# Patient Record
Sex: MALE | Race: WHITE | NOT HISPANIC OR LATINO
[De-identification: names, ages, dates, MRNs, and addresses within clinical notes are randomized per-mention and may not be internally consistent; named-entity substitution may affect disease eponyms.]

---

## 2018-08-21 ENCOUNTER — RX RENEWAL (OUTPATIENT)
Age: 48
End: 2018-08-21

## 2018-08-21 DIAGNOSIS — J45.902 UNSPECIFIED ASTHMA WITH STATUS ASTHMATICUS: ICD-10-CM

## 2018-08-21 PROBLEM — Z00.00 ENCOUNTER FOR PREVENTIVE HEALTH EXAMINATION: Status: ACTIVE | Noted: 2018-08-21

## 2018-09-28 ENCOUNTER — RECORD ABSTRACTING (OUTPATIENT)
Age: 48
End: 2018-09-28

## 2018-09-28 DIAGNOSIS — Z87.898 PERSONAL HISTORY OF OTHER SPECIFIED CONDITIONS: ICD-10-CM

## 2018-09-28 DIAGNOSIS — Z92.29 PERSONAL HISTORY OF OTHER DRUG THERAPY: ICD-10-CM

## 2018-09-28 DIAGNOSIS — G47.33 OBSTRUCTIVE SLEEP APNEA (ADULT) (PEDIATRIC): ICD-10-CM

## 2018-09-29 ENCOUNTER — RECORD ABSTRACTING (OUTPATIENT)
Age: 48
End: 2018-09-29

## 2018-09-29 RX ORDER — ALBUTEROL SULFATE 90 UG/1
108 (90 BASE) POWDER, METERED RESPIRATORY (INHALATION)
Refills: 0 | Status: ACTIVE | COMMUNITY

## 2018-09-29 RX ORDER — ALBUTEROL SULFATE 0.63 MG/3ML
0.63 SOLUTION RESPIRATORY (INHALATION)
Refills: 0 | Status: ACTIVE | COMMUNITY

## 2018-12-19 ENCOUNTER — APPOINTMENT (OUTPATIENT)
Dept: PULMONOLOGY | Facility: CLINIC | Age: 48
End: 2018-12-19
Payer: COMMERCIAL

## 2018-12-19 VITALS — HEART RATE: 102 BPM | SYSTOLIC BLOOD PRESSURE: 138 MMHG | DIASTOLIC BLOOD PRESSURE: 86 MMHG | OXYGEN SATURATION: 94 %

## 2018-12-19 DIAGNOSIS — J18.9 PNEUMONIA, UNSPECIFIED ORGANISM: ICD-10-CM

## 2018-12-19 PROCEDURE — 99214 OFFICE O/P EST MOD 30 MIN: CPT | Mod: 25

## 2018-12-19 PROCEDURE — 94060 EVALUATION OF WHEEZING: CPT

## 2018-12-19 PROCEDURE — 71046 X-RAY EXAM CHEST 2 VIEWS: CPT

## 2019-01-23 ENCOUNTER — RX RENEWAL (OUTPATIENT)
Age: 49
End: 2019-01-23

## 2019-03-27 ENCOUNTER — APPOINTMENT (OUTPATIENT)
Dept: PULMONOLOGY | Facility: CLINIC | Age: 49
End: 2019-03-27
Payer: COMMERCIAL

## 2019-03-27 VITALS
RESPIRATION RATE: 12 BRPM | WEIGHT: 280 LBS | HEIGHT: 72 IN | OXYGEN SATURATION: 97 % | HEART RATE: 114 BPM | BODY MASS INDEX: 37.93 KG/M2 | TEMPERATURE: 97.4 F | DIASTOLIC BLOOD PRESSURE: 85 MMHG | SYSTOLIC BLOOD PRESSURE: 144 MMHG

## 2019-03-27 PROCEDURE — 94060 EVALUATION OF WHEEZING: CPT

## 2019-03-27 PROCEDURE — 99213 OFFICE O/P EST LOW 20 MIN: CPT | Mod: 25

## 2019-03-27 NOTE — PROCEDURE
[FreeTextEntry1] : Spirometry: Normal without a significant bronchodilator response.  improved from last visit.\par

## 2019-03-27 NOTE — HISTORY OF PRESENT ILLNESS
[FreeTextEntry1] : on breo and singulair\par had pneumonia in december, admitted to hospital in [place] x 5 days, fu cxr was normal\par doing well except nasal congestion now that seasons are changing, zyrtec/flonase not working.\par no cough/dyspnea/wheeze

## 2019-09-10 ENCOUNTER — APPOINTMENT (OUTPATIENT)
Dept: PULMONOLOGY | Facility: CLINIC | Age: 49
End: 2019-09-10
Payer: COMMERCIAL

## 2019-09-10 VITALS — HEART RATE: 98 BPM | SYSTOLIC BLOOD PRESSURE: 120 MMHG | OXYGEN SATURATION: 94 % | DIASTOLIC BLOOD PRESSURE: 81 MMHG

## 2019-09-10 DIAGNOSIS — J45.901 UNSPECIFIED ASTHMA WITH (ACUTE) EXACERBATION: ICD-10-CM

## 2019-09-10 PROCEDURE — 94060 EVALUATION OF WHEEZING: CPT

## 2019-09-10 PROCEDURE — 99213 OFFICE O/P EST LOW 20 MIN: CPT | Mod: 25

## 2019-09-10 RX ORDER — PREDNISONE 10 MG/1
10 TABLET ORAL
Qty: 30 | Refills: 0 | Status: ACTIVE | COMMUNITY
Start: 2019-09-10 | End: 1900-01-01

## 2019-09-10 NOTE — REVIEW OF SYSTEMS
[Nasal Congestion] : nasal congestion [Cough] : cough [Dyspnea] : dyspnea [Negative] : Cardiovascular [Postnasal Drip] : no postnasal drip

## 2019-09-10 NOTE — HISTORY OF PRESENT ILLNESS
[FreeTextEntry1] : on breo and singulair\par \par has been coughing/wheezing for "a while" maybe more than weeks\par was on abx (doesn't recall which one) a few weeks ago for congestion\par some dyspnea.\par no fever/chills.

## 2019-09-10 NOTE — PROCEDURE
[FreeTextEntry1] : Spirometry: mild obstructive dysfunction with a significant bronchodilator response.  FEV1 decreased by about 500cc from last visit in march.\par \par Exhaled nitric oxide: 49, elevated\par

## 2019-09-25 ENCOUNTER — APPOINTMENT (OUTPATIENT)
Dept: PULMONOLOGY | Facility: CLINIC | Age: 49
End: 2019-09-25

## 2019-10-16 ENCOUNTER — APPOINTMENT (OUTPATIENT)
Dept: PULMONOLOGY | Facility: CLINIC | Age: 49
End: 2019-10-16

## 2019-10-21 ENCOUNTER — RX RENEWAL (OUTPATIENT)
Age: 49
End: 2019-10-21

## 2019-10-29 ENCOUNTER — APPOINTMENT (OUTPATIENT)
Dept: PULMONOLOGY | Facility: CLINIC | Age: 49
End: 2019-10-29
Payer: COMMERCIAL

## 2019-10-29 VITALS
SYSTOLIC BLOOD PRESSURE: 122 MMHG | HEART RATE: 101 BPM | RESPIRATION RATE: 16 BRPM | TEMPERATURE: 98 F | DIASTOLIC BLOOD PRESSURE: 79 MMHG | OXYGEN SATURATION: 94 %

## 2019-10-29 PROCEDURE — 94060 EVALUATION OF WHEEZING: CPT

## 2019-10-29 PROCEDURE — 99213 OFFICE O/P EST LOW 20 MIN: CPT | Mod: 25

## 2019-10-29 RX ORDER — ALBUTEROL SULFATE 90 UG/1
108 (90 BASE) AEROSOL, METERED RESPIRATORY (INHALATION) EVERY 4 HOURS
Qty: 1 | Refills: 5 | Status: ACTIVE | COMMUNITY
Start: 2019-10-29 | End: 1900-01-01

## 2019-10-29 RX ORDER — METHYLPREDNISOLONE 4 MG/1
4 TABLET ORAL
Qty: 1 | Refills: 1 | Status: ACTIVE | COMMUNITY
Start: 2019-10-29 | End: 1900-01-01

## 2019-10-29 NOTE — PHYSICAL EXAM
[Well Groomed] : well groomed [General Appearance - In No Acute Distress] : no acute distress [Neck Appearance] : the appearance of the neck was normal [Heart Sounds] : normal S1 and S2 [] : no respiratory distress [Respiration, Rhythm And Depth] : normal respiratory rhythm and effort [Exaggerated Use Of Accessory Muscles For Inspiration] : no accessory muscle use [Auscultation Breath Sounds / Voice Sounds] : lungs were clear to auscultation bilaterally [FreeTextEntry1] : no wheeze [Nail Clubbing] : no clubbing of the fingernails [Cyanosis, Localized] : no localized cyanosis

## 2019-10-29 NOTE — PROCEDURE
[FreeTextEntry1] : deferred pft today\par \par Prior exams reviewed:\par Spirometry: mild obstructive dysfunction with a significant bronchodilator response. FEV1 decreased by about 500cc from last visit in march.\par \par Exhaled nitric oxide: 49, elevated

## 2019-10-29 NOTE — HISTORY OF PRESENT ILLNESS
[FreeTextEntry1] : on breo and singulair\par \par says his wife has been telling him hes wheezing at night and not breathing right.  Refuses to do sleep study.\par \par says people tell him he is wheezing but he doesn't notice\par does not recall if prednisone helped him after last visit.

## 2019-10-29 NOTE — ASSESSMENT
[FreeTextEntry1] : cont breo and singulair\par add rescue inhaler to use prn\par will give rx for medrol pack to have at home just in case\par \par discussed likelihood of MICHELE pt understands but adamantly refuses to do sleep study.

## 2019-10-30 ENCOUNTER — APPOINTMENT (OUTPATIENT)
Dept: PULMONOLOGY | Facility: CLINIC | Age: 49
End: 2019-10-30

## 2020-02-24 ENCOUNTER — RX RENEWAL (OUTPATIENT)
Age: 50
End: 2020-02-24

## 2020-03-26 ENCOUNTER — APPOINTMENT (OUTPATIENT)
Dept: SURGERY | Facility: CLINIC | Age: 50
End: 2020-03-26

## 2020-04-05 ENCOUNTER — RX RENEWAL (OUTPATIENT)
Age: 50
End: 2020-04-05

## 2020-05-23 ENCOUNTER — RX RENEWAL (OUTPATIENT)
Age: 50
End: 2020-05-23

## 2020-08-01 ENCOUNTER — RX RENEWAL (OUTPATIENT)
Age: 50
End: 2020-08-01

## 2020-08-01 RX ORDER — FLUTICASONE FUROATE AND VILANTEROL TRIFENATATE 200; 25 UG/1; UG/1
200-25 POWDER RESPIRATORY (INHALATION) DAILY
Qty: 60 | Refills: 0 | Status: ACTIVE | COMMUNITY
Start: 2018-08-21 | End: 1900-01-01

## 2020-08-18 ENCOUNTER — APPOINTMENT (OUTPATIENT)
Dept: PULMONOLOGY | Facility: CLINIC | Age: 50
End: 2020-08-18
Payer: COMMERCIAL

## 2020-08-18 VITALS
HEIGHT: 72 IN | BODY MASS INDEX: 39.28 KG/M2 | HEART RATE: 90 BPM | WEIGHT: 290 LBS | SYSTOLIC BLOOD PRESSURE: 136 MMHG | DIASTOLIC BLOOD PRESSURE: 92 MMHG | TEMPERATURE: 97.9 F | OXYGEN SATURATION: 94 %

## 2020-08-18 DIAGNOSIS — Z91.09 OTHER ALLERGY STATUS, OTHER THAN TO DRUGS AND BIOLOGICAL SUBSTANCES: ICD-10-CM

## 2020-08-18 PROCEDURE — 99214 OFFICE O/P EST MOD 30 MIN: CPT

## 2020-08-18 NOTE — HISTORY OF PRESENT ILLNESS
[TextBox_4] : on breo, singulair, zyrtec\par \par working in [employer] environment\par nasal congestion and dyspnea\par some cough\par no fever/chills\par \par wants something stronger than breo for asthma.

## 2020-08-18 NOTE — PROCEDURE
[FreeTextEntry1] : **unable to do PFT today due to not having recent covid swab**\par \par \par Prior exams reviewed:\par Spirometry: mild obstructive dysfunction with a significant bronchodilator response.  FEV1 decreased by about 500cc from last visit in march.\par \par Exhaled nitric oxide: 49, elevated\par

## 2020-08-18 NOTE — PHYSICAL EXAM
[No Acute Distress] : no acute distress [Normal Appearance] : normal appearance [No Resp Distress] : no resp distress [Normal S1, S2] : normal s1, s2 [No Clubbing] : no clubbing [Clear to Auscultation Bilaterally] : clear to auscultation bilaterally [No Cyanosis] : no cyanosis

## 2020-08-18 NOTE — REVIEW OF SYSTEMS
[Nasal Congestion] : nasal congestion [Cough] : cough [SOB on Exertion] : sob on exertion [Hay Fever] : hay fever [Negative] : Cardiovascular

## 2020-12-08 ENCOUNTER — RX RENEWAL (OUTPATIENT)
Age: 50
End: 2020-12-08

## 2020-12-14 ENCOUNTER — RX RENEWAL (OUTPATIENT)
Age: 50
End: 2020-12-14

## 2021-02-02 ENCOUNTER — RX RENEWAL (OUTPATIENT)
Age: 51
End: 2021-02-02

## 2021-03-06 ENCOUNTER — RX RENEWAL (OUTPATIENT)
Age: 51
End: 2021-03-06

## 2021-03-09 ENCOUNTER — RX RENEWAL (OUTPATIENT)
Age: 51
End: 2021-03-09

## 2021-03-09 RX ORDER — ALBUTEROL SULFATE 90 UG/1
108 (90 BASE) INHALANT RESPIRATORY (INHALATION)
Qty: 8.5 | Refills: 0 | Status: ACTIVE | COMMUNITY
Start: 2020-12-08 | End: 1900-01-01

## 2021-03-25 ENCOUNTER — APPOINTMENT (OUTPATIENT)
Dept: PULMONOLOGY | Facility: CLINIC | Age: 51
End: 2021-03-25
Payer: COMMERCIAL

## 2021-03-25 VITALS
SYSTOLIC BLOOD PRESSURE: 132 MMHG | TEMPERATURE: 98 F | DIASTOLIC BLOOD PRESSURE: 93 MMHG | HEART RATE: 82 BPM | OXYGEN SATURATION: 95 %

## 2021-03-25 DIAGNOSIS — J45.909 UNSPECIFIED ASTHMA, UNCOMPLICATED: ICD-10-CM

## 2021-03-25 DIAGNOSIS — Z01.812 ENCOUNTER FOR PREPROCEDURAL LABORATORY EXAMINATION: ICD-10-CM

## 2021-03-25 DIAGNOSIS — Z20.822 ENCOUNTER FOR PREPROCEDURAL LABORATORY EXAMINATION: ICD-10-CM

## 2021-03-25 PROCEDURE — 71046 X-RAY EXAM CHEST 2 VIEWS: CPT

## 2021-03-25 PROCEDURE — 99072 ADDL SUPL MATRL&STAF TM PHE: CPT

## 2021-03-25 PROCEDURE — 99214 OFFICE O/P EST MOD 30 MIN: CPT | Mod: 25

## 2021-03-25 RX ORDER — METHYLPREDNISOLONE 4 MG/1
4 TABLET ORAL
Qty: 1 | Refills: 1 | Status: ACTIVE | COMMUNITY
Start: 2021-03-25 | End: 1900-01-01

## 2021-03-25 NOTE — ASSESSMENT
[FreeTextEntry1] : cont trelegy and singulair\par \par medrol pack to have on hand if symptoms worsen\par \par fu for PFT\par \par ENT/allergy referral

## 2021-03-25 NOTE — HISTORY OF PRESENT ILLNESS
[TextBox_4] : had covid in october\par not hospitalized\par took zpack\par \par has been wheezing at night\par a lot of nasal congestion--nasal sprays and allergy meds not working\par on trelegy but still wheezes\par also taking singulair

## 2021-03-25 NOTE — PROCEDURE
[FreeTextEntry1] : CXR: clear lungs, no focal consolidation or pleural effusions, cardiac silhouette appears normal.  No bony abnormality.\par \par \par \par Prior exams reviewed:\par Spirometry: mild obstructive dysfunction with a significant bronchodilator response.  FEV1 decreased by about 500cc from last visit in march.\par \par Exhaled nitric oxide: 49, elevated\par

## 2021-06-11 ENCOUNTER — RX RENEWAL (OUTPATIENT)
Age: 51
End: 2021-06-11

## 2021-06-11 RX ORDER — MONTELUKAST 10 MG/1
10 TABLET, FILM COATED ORAL
Qty: 30 | Refills: 2 | Status: ACTIVE | COMMUNITY
Start: 2019-10-21 | End: 1900-01-01

## 2021-10-13 ENCOUNTER — RX RENEWAL (OUTPATIENT)
Age: 51
End: 2021-10-13

## 2021-10-13 RX ORDER — FLUTICASONE FUROATE, UMECLIDINIUM BROMIDE AND VILANTEROL TRIFENATATE 100; 62.5; 25 UG/1; UG/1; UG/1
100-62.5-25 POWDER RESPIRATORY (INHALATION) DAILY
Qty: 60 | Refills: 2 | Status: ACTIVE | COMMUNITY
Start: 2020-08-18 | End: 1900-01-01

## 2022-05-23 ENCOUNTER — RX RENEWAL (OUTPATIENT)
Age: 52
End: 2022-05-23

## 2022-06-13 ENCOUNTER — RX RENEWAL (OUTPATIENT)
Age: 52
End: 2022-06-13

## 2024-05-25 ENCOUNTER — INPATIENT (INPATIENT)
Facility: HOSPITAL | Age: 54
LOS: 26 days | Discharge: HOME | DRG: 951 | End: 2024-06-21
Attending: PHYSICAL MEDICINE & REHABILITATION | Admitting: PHYSICAL MEDICINE & REHABILITATION
Payer: COMMERCIAL

## 2024-05-25 VITALS
RESPIRATION RATE: 16 BRPM | TEMPERATURE: 98 F | OXYGEN SATURATION: 97 % | HEIGHT: 72 IN | SYSTOLIC BLOOD PRESSURE: 116 MMHG | HEART RATE: 79 BPM | DIASTOLIC BLOOD PRESSURE: 80 MMHG | WEIGHT: 226.19 LBS

## 2024-05-25 DIAGNOSIS — Z98.890 OTHER SPECIFIED POSTPROCEDURAL STATES: Chronic | ICD-10-CM

## 2024-05-25 DIAGNOSIS — Z98.1 ARTHRODESIS STATUS: ICD-10-CM

## 2024-05-25 LAB — SARS-COV-2 RNA SPEC QL NAA+PROBE: SIGNIFICANT CHANGE UP

## 2024-05-25 PROCEDURE — 71045 X-RAY EXAM CHEST 1 VIEW: CPT | Mod: 26

## 2024-05-25 RX ORDER — ESCITALOPRAM OXALATE 20 MG/1
10 TABLET, FILM COATED ORAL DAILY
Refills: 0 | Status: DISCONTINUED | OUTPATIENT
Start: 2024-05-26 | End: 2024-06-05

## 2024-05-25 RX ORDER — HEPARIN SODIUM 50 [USP'U]/ML
5000 INJECTION, SOLUTION INTRAVENOUS EVERY 12 HOURS
Refills: 0 | Status: DISCONTINUED | OUTPATIENT
Start: 2024-05-25 | End: 2024-06-02

## 2024-05-25 RX ORDER — ONDANSETRON HYDROCHLORIDE 2 MG/ML
4 INJECTION INTRAMUSCULAR; INTRAVENOUS THREE TIMES A DAY
Refills: 0 | Status: DISCONTINUED | OUTPATIENT
Start: 2024-05-25 | End: 2024-06-21

## 2024-05-25 RX ORDER — ERGOCALCIFEROL 1.25 MG/1
50000 CAPSULE ORAL
Refills: 0 | Status: DISCONTINUED | OUTPATIENT
Start: 2024-06-01 | End: 2024-06-21

## 2024-05-25 RX ORDER — FEXOFENADINE HCL 180 MG
180 TABLET ORAL DAILY
Refills: 0 | Status: DISCONTINUED | OUTPATIENT
Start: 2024-05-26 | End: 2024-06-21

## 2024-05-25 RX ORDER — FLUTICASONE FUROATE, UMECLIDINIUM BROMIDE AND VILANTEROL TRIFENATATE 200; 62.5; 25 UG/1; UG/1; UG/1
1 POWDER RESPIRATORY (INHALATION) DAILY
Refills: 0 | Status: DISCONTINUED | OUTPATIENT
Start: 2024-05-26 | End: 2024-06-09

## 2024-05-25 RX ORDER — SENNOSIDES 8.6 MG
2 TABLET ORAL AT BEDTIME
Refills: 0 | Status: DISCONTINUED | OUTPATIENT
Start: 2024-05-25 | End: 2024-06-21

## 2024-05-25 RX ORDER — BACLOFEN 20 MG
15 TABLET ORAL EVERY 8 HOURS
Refills: 0 | Status: DISCONTINUED | OUTPATIENT
Start: 2024-05-25 | End: 2024-06-08

## 2024-05-25 RX ORDER — ACETAMINOPHEN 325 MG
975 TABLET ORAL EVERY 6 HOURS
Refills: 0 | Status: DISCONTINUED | OUTPATIENT
Start: 2024-05-25 | End: 2024-05-31

## 2024-05-25 RX ORDER — OXYCODONE HYDROCHLORIDE 100 MG/5ML
5 SOLUTION ORAL EVERY 4 HOURS
Refills: 0 | Status: DISCONTINUED | OUTPATIENT
Start: 2024-05-25 | End: 2024-05-29

## 2024-05-25 RX ORDER — CEFTRIAXONE SODIUM 500 MG
2000 VIAL (EA) INJECTION EVERY 12 HOURS
Refills: 0 | Status: DISCONTINUED | OUTPATIENT
Start: 2024-05-25 | End: 2024-05-27

## 2024-05-25 RX ORDER — MONTELUKAST SODIUM 10 MG/1
10 TABLET, FILM COATED ORAL AT BEDTIME
Refills: 0 | Status: DISCONTINUED | OUTPATIENT
Start: 2024-05-25 | End: 2024-06-21

## 2024-05-25 RX ORDER — ZINC SULFATE 50(220)MG
220 CAPSULE ORAL DAILY
Refills: 0 | Status: DISCONTINUED | OUTPATIENT
Start: 2024-05-26 | End: 2024-06-21

## 2024-05-25 RX ORDER — GABAPENTIN
800 POWDER (GRAM) MISCELLANEOUS THREE TIMES A DAY
Refills: 0 | Status: DISCONTINUED | OUTPATIENT
Start: 2024-05-25 | End: 2024-05-31

## 2024-05-25 RX ORDER — PREGABALIN 50 MG/1
100 CAPSULE ORAL THREE TIMES A DAY
Refills: 0 | Status: DISCONTINUED | OUTPATIENT
Start: 2024-05-25 | End: 2024-05-29

## 2024-05-25 RX ORDER — LIDOCAINE HCL 28 MG/G
2 GEL TOPICAL EVERY 24 HOURS
Refills: 0 | Status: DISCONTINUED | OUTPATIENT
Start: 2024-05-25 | End: 2024-06-21

## 2024-05-25 RX ORDER — SIMETHICONE 40MG/0.6ML
80 SUSPENSION, DROPS(FINAL DOSAGE FORM)(ML) ORAL
Refills: 0 | Status: DISCONTINUED | OUTPATIENT
Start: 2024-05-25 | End: 2024-06-21

## 2024-05-25 RX ORDER — POLYETHYLENE GLYCOL 3350 1 G/G
17 POWDER ORAL DAILY
Refills: 0 | Status: DISCONTINUED | OUTPATIENT
Start: 2024-05-26 | End: 2024-06-21

## 2024-05-25 RX ORDER — DIPHENHYDRAMINE HCL 12.5MG/5ML
25 ELIXIR ORAL EVERY 4 HOURS
Refills: 0 | Status: DISCONTINUED | OUTPATIENT
Start: 2024-05-25 | End: 2024-06-21

## 2024-05-25 RX ORDER — FLUTICASONE PROPIONATE 50 UG/1
2 SPRAY, METERED NASAL
Refills: 0 | Status: DISCONTINUED | OUTPATIENT
Start: 2024-05-26 | End: 2024-06-21

## 2024-05-25 RX ORDER — OXYCODONE HYDROCHLORIDE 100 MG/5ML
10 SOLUTION ORAL EVERY 24 HOURS
Refills: 0 | Status: DISCONTINUED | OUTPATIENT
Start: 2024-05-25 | End: 2024-05-27

## 2024-05-25 RX ORDER — OXYCODONE HYDROCHLORIDE 100 MG/5ML
10 SOLUTION ORAL EVERY 4 HOURS
Refills: 0 | Status: DISCONTINUED | OUTPATIENT
Start: 2024-05-25 | End: 2024-05-25

## 2024-05-25 RX ORDER — E-LYTES/CARBOXYMETHYLCELLULOSE
5 SOLUTION, NON-ORAL MUCOUS MEMBRANE THREE TIMES A DAY
Refills: 0 | Status: DISCONTINUED | OUTPATIENT
Start: 2024-05-25 | End: 2024-06-21

## 2024-05-25 RX ADMIN — HEPARIN SODIUM 5000 UNIT(S): 50 INJECTION, SOLUTION INTRAVENOUS at 21:54

## 2024-05-25 RX ADMIN — Medication 975 MILLIGRAM(S): at 22:50

## 2024-05-25 RX ADMIN — Medication 15 MILLIGRAM(S): at 21:57

## 2024-05-25 RX ADMIN — Medication 2 TABLET(S): at 21:52

## 2024-05-25 RX ADMIN — OXYCODONE HYDROCHLORIDE 10 MILLIGRAM(S): 100 SOLUTION ORAL at 20:00

## 2024-05-25 RX ADMIN — OXYCODONE HYDROCHLORIDE 10 MILLIGRAM(S): 100 SOLUTION ORAL at 19:33

## 2024-05-25 RX ADMIN — PREGABALIN 100 MILLIGRAM(S): 50 CAPSULE ORAL at 21:52

## 2024-05-25 RX ADMIN — Medication 975 MILLIGRAM(S): at 21:53

## 2024-05-25 RX ADMIN — Medication 800 MILLIGRAM(S): at 21:52

## 2024-05-25 RX ADMIN — Medication 100 MILLIGRAM(S): at 21:51

## 2024-05-25 RX ADMIN — MONTELUKAST SODIUM 10 MILLIGRAM(S): 10 TABLET, FILM COATED ORAL at 21:53

## 2024-05-26 LAB
ALBUMIN SERPL ELPH-MCNC: 2.5 G/DL — LOW (ref 3.3–5)
ALP SERPL-CCNC: 91 U/L — SIGNIFICANT CHANGE UP (ref 40–120)
ALT FLD-CCNC: 17 U/L — SIGNIFICANT CHANGE UP (ref 10–45)
ANION GAP SERPL CALC-SCNC: 5 MMOL/L — SIGNIFICANT CHANGE UP (ref 5–17)
AST SERPL-CCNC: 11 U/L — SIGNIFICANT CHANGE UP (ref 10–40)
BASOPHILS # BLD AUTO: 0.05 K/UL — SIGNIFICANT CHANGE UP (ref 0–0.2)
BASOPHILS NFR BLD AUTO: 0.7 % — SIGNIFICANT CHANGE UP (ref 0–2)
BILIRUB SERPL-MCNC: 0.4 MG/DL — SIGNIFICANT CHANGE UP (ref 0.2–1.2)
BUN SERPL-MCNC: 15 MG/DL — SIGNIFICANT CHANGE UP (ref 7–23)
CALCIUM SERPL-MCNC: 9.4 MG/DL — SIGNIFICANT CHANGE UP (ref 8.4–10.5)
CHLORIDE SERPL-SCNC: 102 MMOL/L — SIGNIFICANT CHANGE UP (ref 96–108)
CO2 SERPL-SCNC: 32 MMOL/L — HIGH (ref 22–31)
CREAT SERPL-MCNC: 0.84 MG/DL — SIGNIFICANT CHANGE UP (ref 0.5–1.3)
EGFR: 104 ML/MIN/1.73M2 — SIGNIFICANT CHANGE UP
EOSINOPHIL # BLD AUTO: 0.11 K/UL — SIGNIFICANT CHANGE UP (ref 0–0.5)
EOSINOPHIL NFR BLD AUTO: 1.5 % — SIGNIFICANT CHANGE UP (ref 0–6)
GLUCOSE SERPL-MCNC: 101 MG/DL — HIGH (ref 70–99)
HCT VFR BLD CALC: 30.9 % — LOW (ref 39–50)
HGB BLD-MCNC: 10.2 G/DL — LOW (ref 13–17)
IMM GRANULOCYTES NFR BLD AUTO: 2.1 % — HIGH (ref 0–0.9)
LYMPHOCYTES # BLD AUTO: 1.57 K/UL — SIGNIFICANT CHANGE UP (ref 1–3.3)
LYMPHOCYTES # BLD AUTO: 21.5 % — SIGNIFICANT CHANGE UP (ref 13–44)
MCHC RBC-ENTMCNC: 30.1 PG — SIGNIFICANT CHANGE UP (ref 27–34)
MCHC RBC-ENTMCNC: 33 GM/DL — SIGNIFICANT CHANGE UP (ref 32–36)
MCV RBC AUTO: 91.2 FL — SIGNIFICANT CHANGE UP (ref 80–100)
MONOCYTES # BLD AUTO: 0.76 K/UL — SIGNIFICANT CHANGE UP (ref 0–0.9)
MONOCYTES NFR BLD AUTO: 10.4 % — SIGNIFICANT CHANGE UP (ref 2–14)
NEUTROPHILS # BLD AUTO: 4.66 K/UL — SIGNIFICANT CHANGE UP (ref 1.8–7.4)
NEUTROPHILS NFR BLD AUTO: 63.8 % — SIGNIFICANT CHANGE UP (ref 43–77)
NRBC # BLD: 0 /100 WBCS — SIGNIFICANT CHANGE UP (ref 0–0)
PLATELET # BLD AUTO: 370 K/UL — SIGNIFICANT CHANGE UP (ref 150–400)
POTASSIUM SERPL-MCNC: 4.1 MMOL/L — SIGNIFICANT CHANGE UP (ref 3.5–5.3)
POTASSIUM SERPL-SCNC: 4.1 MMOL/L — SIGNIFICANT CHANGE UP (ref 3.5–5.3)
PROT SERPL-MCNC: 8.2 G/DL — SIGNIFICANT CHANGE UP (ref 6–8.3)
RBC # BLD: 3.39 M/UL — LOW (ref 4.2–5.8)
RBC # FLD: 17.2 % — HIGH (ref 10.3–14.5)
SODIUM SERPL-SCNC: 139 MMOL/L — SIGNIFICANT CHANGE UP (ref 135–145)
WBC # BLD: 7.3 K/UL — SIGNIFICANT CHANGE UP (ref 3.8–10.5)
WBC # FLD AUTO: 7.3 K/UL — SIGNIFICANT CHANGE UP (ref 3.8–10.5)

## 2024-05-26 PROCEDURE — 99223 1ST HOSP IP/OBS HIGH 75: CPT

## 2024-05-26 PROCEDURE — 99222 1ST HOSP IP/OBS MODERATE 55: CPT

## 2024-05-26 RX ADMIN — FLUTICASONE PROPIONATE 2 SPRAY(S): 50 SPRAY, METERED NASAL at 06:44

## 2024-05-26 RX ADMIN — Medication 100 MILLIGRAM(S): at 19:54

## 2024-05-26 RX ADMIN — Medication 975 MILLIGRAM(S): at 06:44

## 2024-05-26 RX ADMIN — Medication 975 MILLIGRAM(S): at 17:39

## 2024-05-26 RX ADMIN — Medication 975 MILLIGRAM(S): at 17:52

## 2024-05-26 RX ADMIN — Medication 800 MILLIGRAM(S): at 06:44

## 2024-05-26 RX ADMIN — Medication 15 MILLIGRAM(S): at 22:45

## 2024-05-26 RX ADMIN — Medication 975 MILLIGRAM(S): at 07:28

## 2024-05-26 RX ADMIN — Medication 975 MILLIGRAM(S): at 12:48

## 2024-05-26 RX ADMIN — Medication 800 MILLIGRAM(S): at 14:46

## 2024-05-26 RX ADMIN — PREGABALIN 100 MILLIGRAM(S): 50 CAPSULE ORAL at 14:46

## 2024-05-26 RX ADMIN — Medication 2 TABLET(S): at 22:43

## 2024-05-26 RX ADMIN — Medication 800 MILLIGRAM(S): at 22:44

## 2024-05-26 RX ADMIN — FLUTICASONE FUROATE, UMECLIDINIUM BROMIDE AND VILANTEROL TRIFENATATE 1 PUFF(S): 200; 62.5; 25 POWDER RESPIRATORY (INHALATION) at 08:33

## 2024-05-26 RX ADMIN — PREGABALIN 100 MILLIGRAM(S): 50 CAPSULE ORAL at 06:43

## 2024-05-26 RX ADMIN — PREGABALIN 100 MILLIGRAM(S): 50 CAPSULE ORAL at 22:45

## 2024-05-26 RX ADMIN — HEPARIN SODIUM 5000 UNIT(S): 50 INJECTION, SOLUTION INTRAVENOUS at 09:36

## 2024-05-26 RX ADMIN — Medication 975 MILLIGRAM(S): at 12:56

## 2024-05-26 RX ADMIN — ESCITALOPRAM OXALATE 10 MILLIGRAM(S): 20 TABLET, FILM COATED ORAL at 12:50

## 2024-05-26 RX ADMIN — POLYETHYLENE GLYCOL 3350 17 GRAM(S): 1 POWDER ORAL at 12:50

## 2024-05-26 RX ADMIN — Medication 15 MILLIGRAM(S): at 14:45

## 2024-05-26 RX ADMIN — HEPARIN SODIUM 5000 UNIT(S): 50 INJECTION, SOLUTION INTRAVENOUS at 22:45

## 2024-05-26 RX ADMIN — Medication 180 MILLIGRAM(S): at 12:48

## 2024-05-26 RX ADMIN — Medication 220 MILLIGRAM(S): at 12:50

## 2024-05-26 RX ADMIN — Medication 15 MILLIGRAM(S): at 06:43

## 2024-05-26 RX ADMIN — Medication 100 MILLIGRAM(S): at 08:51

## 2024-05-26 RX ADMIN — MONTELUKAST SODIUM 10 MILLIGRAM(S): 10 TABLET, FILM COATED ORAL at 22:45

## 2024-05-27 PROCEDURE — 99232 SBSQ HOSP IP/OBS MODERATE 35: CPT

## 2024-05-27 RX ORDER — OXYCODONE HYDROCHLORIDE 100 MG/5ML
10 SOLUTION ORAL EVERY 4 HOURS
Refills: 0 | Status: DISCONTINUED | OUTPATIENT
Start: 2024-05-27 | End: 2024-05-29

## 2024-05-27 RX ORDER — CEFTRIAXONE SODIUM 500 MG
2 VIAL (EA) INJECTION
Refills: 0 | Status: DISCONTINUED | OUTPATIENT
Start: 2024-05-27 | End: 2024-05-27

## 2024-05-27 RX ORDER — CEFTRIAXONE SODIUM 500 MG
2000 VIAL (EA) INJECTION
Refills: 0 | Status: DISCONTINUED | OUTPATIENT
Start: 2024-05-27 | End: 2024-06-21

## 2024-05-27 RX ADMIN — FLUTICASONE PROPIONATE 2 SPRAY(S): 50 SPRAY, METERED NASAL at 06:58

## 2024-05-27 RX ADMIN — MONTELUKAST SODIUM 10 MILLIGRAM(S): 10 TABLET, FILM COATED ORAL at 22:57

## 2024-05-27 RX ADMIN — Medication 100 MILLIGRAM(S): at 18:48

## 2024-05-27 RX ADMIN — Medication 15 MILLIGRAM(S): at 06:04

## 2024-05-27 RX ADMIN — PREGABALIN 100 MILLIGRAM(S): 50 CAPSULE ORAL at 06:05

## 2024-05-27 RX ADMIN — Medication 180 MILLIGRAM(S): at 12:33

## 2024-05-27 RX ADMIN — OXYCODONE HYDROCHLORIDE 10 MILLIGRAM(S): 100 SOLUTION ORAL at 13:30

## 2024-05-27 RX ADMIN — Medication 220 MILLIGRAM(S): at 12:32

## 2024-05-27 RX ADMIN — OXYCODONE HYDROCHLORIDE 10 MILLIGRAM(S): 100 SOLUTION ORAL at 12:32

## 2024-05-27 RX ADMIN — HEPARIN SODIUM 5000 UNIT(S): 50 INJECTION, SOLUTION INTRAVENOUS at 10:16

## 2024-05-27 RX ADMIN — OXYCODONE HYDROCHLORIDE 10 MILLIGRAM(S): 100 SOLUTION ORAL at 08:15

## 2024-05-27 RX ADMIN — OXYCODONE HYDROCHLORIDE 10 MILLIGRAM(S): 100 SOLUTION ORAL at 09:15

## 2024-05-27 RX ADMIN — ESCITALOPRAM OXALATE 10 MILLIGRAM(S): 20 TABLET, FILM COATED ORAL at 12:32

## 2024-05-27 RX ADMIN — Medication 1 TABLET(S): at 12:32

## 2024-05-27 RX ADMIN — Medication 975 MILLIGRAM(S): at 18:47

## 2024-05-27 RX ADMIN — Medication 800 MILLIGRAM(S): at 06:05

## 2024-05-27 RX ADMIN — HEPARIN SODIUM 5000 UNIT(S): 50 INJECTION, SOLUTION INTRAVENOUS at 22:47

## 2024-05-27 RX ADMIN — Medication 100 MILLIGRAM(S): at 08:15

## 2024-05-27 RX ADMIN — Medication 15 MILLIGRAM(S): at 13:24

## 2024-05-27 RX ADMIN — Medication 975 MILLIGRAM(S): at 03:04

## 2024-05-27 RX ADMIN — FLUTICASONE FUROATE, UMECLIDINIUM BROMIDE AND VILANTEROL TRIFENATATE 1 PUFF(S): 200; 62.5; 25 POWDER RESPIRATORY (INHALATION) at 08:29

## 2024-05-27 RX ADMIN — PREGABALIN 100 MILLIGRAM(S): 50 CAPSULE ORAL at 13:25

## 2024-05-27 RX ADMIN — Medication 800 MILLIGRAM(S): at 13:24

## 2024-05-28 LAB
ANION GAP SERPL CALC-SCNC: 10 MMOL/L — SIGNIFICANT CHANGE UP (ref 5–17)
BUN SERPL-MCNC: 16 MG/DL — SIGNIFICANT CHANGE UP (ref 7–23)
CALCIUM SERPL-MCNC: 9.3 MG/DL — SIGNIFICANT CHANGE UP (ref 8.4–10.5)
CHLORIDE SERPL-SCNC: 102 MMOL/L — SIGNIFICANT CHANGE UP (ref 96–108)
CO2 SERPL-SCNC: 27 MMOL/L — SIGNIFICANT CHANGE UP (ref 22–31)
CREAT SERPL-MCNC: 0.78 MG/DL — SIGNIFICANT CHANGE UP (ref 0.5–1.3)
EGFR: 107 ML/MIN/1.73M2 — SIGNIFICANT CHANGE UP
GLUCOSE SERPL-MCNC: 117 MG/DL — HIGH (ref 70–99)
HCT VFR BLD CALC: 30.2 % — LOW (ref 39–50)
HGB BLD-MCNC: 10.6 G/DL — LOW (ref 13–17)
MCHC RBC-ENTMCNC: 31.4 PG — SIGNIFICANT CHANGE UP (ref 27–34)
MCHC RBC-ENTMCNC: 35.1 GM/DL — SIGNIFICANT CHANGE UP (ref 32–36)
MCV RBC AUTO: 89.3 FL — SIGNIFICANT CHANGE UP (ref 80–100)
NRBC # BLD: 0 /100 WBCS — SIGNIFICANT CHANGE UP (ref 0–0)
PLATELET # BLD AUTO: 338 K/UL — SIGNIFICANT CHANGE UP (ref 150–400)
POTASSIUM SERPL-MCNC: 3.8 MMOL/L — SIGNIFICANT CHANGE UP (ref 3.5–5.3)
POTASSIUM SERPL-SCNC: 3.8 MMOL/L — SIGNIFICANT CHANGE UP (ref 3.5–5.3)
RBC # BLD: 3.38 M/UL — LOW (ref 4.2–5.8)
RBC # FLD: 16.9 % — HIGH (ref 10.3–14.5)
SODIUM SERPL-SCNC: 139 MMOL/L — SIGNIFICANT CHANGE UP (ref 135–145)
WBC # BLD: 7.75 K/UL — SIGNIFICANT CHANGE UP (ref 3.8–10.5)
WBC # FLD AUTO: 7.75 K/UL — SIGNIFICANT CHANGE UP (ref 3.8–10.5)

## 2024-05-28 PROCEDURE — 99232 SBSQ HOSP IP/OBS MODERATE 35: CPT

## 2024-05-28 PROCEDURE — 76870 US EXAM SCROTUM: CPT | Mod: 26

## 2024-05-28 RX ORDER — NALOXONE HYDROCHLORIDE 1 MG/ML
0.04 INJECTION PARENTERAL
Refills: 0 | Status: DISCONTINUED | OUTPATIENT
Start: 2024-05-28 | End: 2024-06-21

## 2024-05-28 RX ADMIN — Medication 100 MILLIGRAM(S): at 17:21

## 2024-05-28 RX ADMIN — Medication 800 MILLIGRAM(S): at 13:15

## 2024-05-28 RX ADMIN — OXYCODONE HYDROCHLORIDE 5 MILLIGRAM(S): 100 SOLUTION ORAL at 06:50

## 2024-05-28 RX ADMIN — Medication 15 MILLIGRAM(S): at 21:54

## 2024-05-28 RX ADMIN — PREGABALIN 100 MILLIGRAM(S): 50 CAPSULE ORAL at 13:14

## 2024-05-28 RX ADMIN — Medication 2 TABLET(S): at 21:55

## 2024-05-28 RX ADMIN — Medication 15 MILLIGRAM(S): at 05:13

## 2024-05-28 RX ADMIN — MONTELUKAST SODIUM 10 MILLIGRAM(S): 10 TABLET, FILM COATED ORAL at 21:54

## 2024-05-28 RX ADMIN — Medication 975 MILLIGRAM(S): at 12:45

## 2024-05-28 RX ADMIN — Medication 975 MILLIGRAM(S): at 22:25

## 2024-05-28 RX ADMIN — HEPARIN SODIUM 5000 UNIT(S): 50 INJECTION, SOLUTION INTRAVENOUS at 11:58

## 2024-05-28 RX ADMIN — Medication 975 MILLIGRAM(S): at 05:14

## 2024-05-28 RX ADMIN — OXYCODONE HYDROCHLORIDE 5 MILLIGRAM(S): 100 SOLUTION ORAL at 02:11

## 2024-05-28 RX ADMIN — Medication 975 MILLIGRAM(S): at 21:55

## 2024-05-28 RX ADMIN — OXYCODONE HYDROCHLORIDE 10 MILLIGRAM(S): 100 SOLUTION ORAL at 08:11

## 2024-05-28 RX ADMIN — Medication 975 MILLIGRAM(S): at 06:00

## 2024-05-28 RX ADMIN — OXYCODONE HYDROCHLORIDE 10 MILLIGRAM(S): 100 SOLUTION ORAL at 09:00

## 2024-05-28 RX ADMIN — Medication 220 MILLIGRAM(S): at 17:21

## 2024-05-28 RX ADMIN — Medication 975 MILLIGRAM(S): at 17:21

## 2024-05-28 RX ADMIN — HEPARIN SODIUM 5000 UNIT(S): 50 INJECTION, SOLUTION INTRAVENOUS at 21:57

## 2024-05-28 RX ADMIN — Medication 1 TABLET(S): at 11:58

## 2024-05-28 RX ADMIN — Medication 975 MILLIGRAM(S): at 18:15

## 2024-05-28 RX ADMIN — PREGABALIN 100 MILLIGRAM(S): 50 CAPSULE ORAL at 22:01

## 2024-05-28 RX ADMIN — FLUTICASONE FUROATE, UMECLIDINIUM BROMIDE AND VILANTEROL TRIFENATATE 1 PUFF(S): 200; 62.5; 25 POWDER RESPIRATORY (INHALATION) at 10:50

## 2024-05-28 RX ADMIN — Medication 15 MILLIGRAM(S): at 17:21

## 2024-05-28 RX ADMIN — Medication 180 MILLIGRAM(S): at 12:03

## 2024-05-28 RX ADMIN — ESCITALOPRAM OXALATE 10 MILLIGRAM(S): 20 TABLET, FILM COATED ORAL at 12:02

## 2024-05-28 RX ADMIN — Medication 100 MILLIGRAM(S): at 05:14

## 2024-05-28 RX ADMIN — LIDOCAINE HCL 2 PATCH: 28 GEL TOPICAL at 21:55

## 2024-05-28 RX ADMIN — Medication 975 MILLIGRAM(S): at 11:58

## 2024-05-28 RX ADMIN — FLUTICASONE PROPIONATE 2 SPRAY(S): 50 SPRAY, METERED NASAL at 06:50

## 2024-05-28 RX ADMIN — Medication 800 MILLIGRAM(S): at 21:55

## 2024-05-29 PROCEDURE — 99232 SBSQ HOSP IP/OBS MODERATE 35: CPT

## 2024-05-29 RX ORDER — OXYCODONE HYDROCHLORIDE 100 MG/5ML
5 SOLUTION ORAL EVERY 4 HOURS
Refills: 0 | Status: DISCONTINUED | OUTPATIENT
Start: 2024-05-29 | End: 2024-05-31

## 2024-05-29 RX ORDER — OXYCODONE HYDROCHLORIDE 100 MG/5ML
10 SOLUTION ORAL EVERY 4 HOURS
Refills: 0 | Status: DISCONTINUED | OUTPATIENT
Start: 2024-05-29 | End: 2024-05-31

## 2024-05-29 RX ORDER — PREGABALIN 50 MG/1
100 CAPSULE ORAL THREE TIMES A DAY
Refills: 0 | Status: DISCONTINUED | OUTPATIENT
Start: 2024-05-29 | End: 2024-06-03

## 2024-05-29 RX ADMIN — Medication 975 MILLIGRAM(S): at 06:05

## 2024-05-29 RX ADMIN — Medication 975 MILLIGRAM(S): at 17:44

## 2024-05-29 RX ADMIN — Medication 180 MILLIGRAM(S): at 12:52

## 2024-05-29 RX ADMIN — Medication 800 MILLIGRAM(S): at 22:12

## 2024-05-29 RX ADMIN — PREGABALIN 100 MILLIGRAM(S): 50 CAPSULE ORAL at 22:14

## 2024-05-29 RX ADMIN — HEPARIN SODIUM 5000 UNIT(S): 50 INJECTION, SOLUTION INTRAVENOUS at 22:12

## 2024-05-29 RX ADMIN — LIDOCAINE HCL 2 PATCH: 28 GEL TOPICAL at 10:26

## 2024-05-29 RX ADMIN — Medication 975 MILLIGRAM(S): at 18:30

## 2024-05-29 RX ADMIN — PREGABALIN 100 MILLIGRAM(S): 50 CAPSULE ORAL at 06:06

## 2024-05-29 RX ADMIN — Medication 220 MILLIGRAM(S): at 12:52

## 2024-05-29 RX ADMIN — FLUTICASONE FUROATE, UMECLIDINIUM BROMIDE AND VILANTEROL TRIFENATATE 1 PUFF(S): 200; 62.5; 25 POWDER RESPIRATORY (INHALATION) at 08:03

## 2024-05-29 RX ADMIN — ESCITALOPRAM OXALATE 10 MILLIGRAM(S): 20 TABLET, FILM COATED ORAL at 12:53

## 2024-05-29 RX ADMIN — Medication 100 MILLIGRAM(S): at 06:04

## 2024-05-29 RX ADMIN — LIDOCAINE HCL 2 PATCH: 28 GEL TOPICAL at 07:25

## 2024-05-29 RX ADMIN — HEPARIN SODIUM 5000 UNIT(S): 50 INJECTION, SOLUTION INTRAVENOUS at 12:59

## 2024-05-29 RX ADMIN — FLUTICASONE PROPIONATE 2 SPRAY(S): 50 SPRAY, METERED NASAL at 06:06

## 2024-05-29 RX ADMIN — Medication 975 MILLIGRAM(S): at 06:35

## 2024-05-29 RX ADMIN — Medication 15 MILLIGRAM(S): at 06:05

## 2024-05-29 RX ADMIN — Medication 15 MILLIGRAM(S): at 14:44

## 2024-05-29 RX ADMIN — Medication 1 TABLET(S): at 12:53

## 2024-05-29 RX ADMIN — LIDOCAINE HCL 2 PATCH: 28 GEL TOPICAL at 22:10

## 2024-05-29 RX ADMIN — Medication 800 MILLIGRAM(S): at 06:05

## 2024-05-29 RX ADMIN — PREGABALIN 100 MILLIGRAM(S): 50 CAPSULE ORAL at 15:50

## 2024-05-29 RX ADMIN — OXYCODONE HYDROCHLORIDE 10 MILLIGRAM(S): 100 SOLUTION ORAL at 09:00

## 2024-05-29 RX ADMIN — Medication 15 MILLIGRAM(S): at 21:06

## 2024-05-29 RX ADMIN — Medication 100 MILLIGRAM(S): at 17:44

## 2024-05-29 RX ADMIN — Medication 800 MILLIGRAM(S): at 14:44

## 2024-05-29 RX ADMIN — OXYCODONE HYDROCHLORIDE 10 MILLIGRAM(S): 100 SOLUTION ORAL at 08:19

## 2024-05-29 RX ADMIN — Medication 975 MILLIGRAM(S): at 12:53

## 2024-05-29 RX ADMIN — MONTELUKAST SODIUM 10 MILLIGRAM(S): 10 TABLET, FILM COATED ORAL at 22:12

## 2024-05-30 LAB
ANION GAP SERPL CALC-SCNC: 12 MMOL/L — SIGNIFICANT CHANGE UP (ref 5–17)
APPEARANCE UR: CLEAR — SIGNIFICANT CHANGE UP
BILIRUB UR-MCNC: NEGATIVE — SIGNIFICANT CHANGE UP
BUN SERPL-MCNC: 20 MG/DL — SIGNIFICANT CHANGE UP (ref 7–23)
CALCIUM SERPL-MCNC: 9.7 MG/DL — SIGNIFICANT CHANGE UP (ref 8.4–10.5)
CHLORIDE SERPL-SCNC: 104 MMOL/L — SIGNIFICANT CHANGE UP (ref 96–108)
CO2 SERPL-SCNC: 26 MMOL/L — SIGNIFICANT CHANGE UP (ref 22–31)
COLOR SPEC: YELLOW — SIGNIFICANT CHANGE UP
CREAT SERPL-MCNC: 0.83 MG/DL — SIGNIFICANT CHANGE UP (ref 0.5–1.3)
DIFF PNL FLD: NEGATIVE — SIGNIFICANT CHANGE UP
EGFR: 105 ML/MIN/1.73M2 — SIGNIFICANT CHANGE UP
GLUCOSE SERPL-MCNC: 101 MG/DL — HIGH (ref 70–99)
GLUCOSE UR QL: NEGATIVE MG/DL — SIGNIFICANT CHANGE UP
HCT VFR BLD CALC: 32.7 % — LOW (ref 39–50)
HGB BLD-MCNC: 10.9 G/DL — LOW (ref 13–17)
KETONES UR-MCNC: ABNORMAL MG/DL
LEUKOCYTE ESTERASE UR-ACNC: NEGATIVE — SIGNIFICANT CHANGE UP
MCHC RBC-ENTMCNC: 30.5 PG — SIGNIFICANT CHANGE UP (ref 27–34)
MCHC RBC-ENTMCNC: 33.3 GM/DL — SIGNIFICANT CHANGE UP (ref 32–36)
MCV RBC AUTO: 91.6 FL — SIGNIFICANT CHANGE UP (ref 80–100)
NITRITE UR-MCNC: NEGATIVE — SIGNIFICANT CHANGE UP
NRBC # BLD: 0 /100 WBCS — SIGNIFICANT CHANGE UP (ref 0–0)
PH UR: 5.5 — SIGNIFICANT CHANGE UP (ref 5–8)
PLATELET # BLD AUTO: 329 K/UL — SIGNIFICANT CHANGE UP (ref 150–400)
POTASSIUM SERPL-MCNC: 4.1 MMOL/L — SIGNIFICANT CHANGE UP (ref 3.5–5.3)
POTASSIUM SERPL-SCNC: 4.1 MMOL/L — SIGNIFICANT CHANGE UP (ref 3.5–5.3)
PROT UR-MCNC: SIGNIFICANT CHANGE UP MG/DL
RBC # BLD: 3.57 M/UL — LOW (ref 4.2–5.8)
RBC # FLD: 16.7 % — HIGH (ref 10.3–14.5)
SODIUM SERPL-SCNC: 142 MMOL/L — SIGNIFICANT CHANGE UP (ref 135–145)
SP GR SPEC: 1.03 — HIGH (ref 1–1.03)
UROBILINOGEN FLD QL: 1 MG/DL — SIGNIFICANT CHANGE UP (ref 0.2–1)
WBC # BLD: 7.96 K/UL — SIGNIFICANT CHANGE UP (ref 3.8–10.5)
WBC # FLD AUTO: 7.96 K/UL — SIGNIFICANT CHANGE UP (ref 3.8–10.5)

## 2024-05-30 PROCEDURE — 99232 SBSQ HOSP IP/OBS MODERATE 35: CPT

## 2024-05-30 RX ADMIN — Medication 180 MILLIGRAM(S): at 11:37

## 2024-05-30 RX ADMIN — Medication 975 MILLIGRAM(S): at 13:30

## 2024-05-30 RX ADMIN — LIDOCAINE HCL 2 PATCH: 28 GEL TOPICAL at 21:04

## 2024-05-30 RX ADMIN — Medication 15 MILLIGRAM(S): at 14:28

## 2024-05-30 RX ADMIN — Medication 15 MILLIGRAM(S): at 06:27

## 2024-05-30 RX ADMIN — FLUTICASONE FUROATE, UMECLIDINIUM BROMIDE AND VILANTEROL TRIFENATATE 1 PUFF(S): 200; 62.5; 25 POWDER RESPIRATORY (INHALATION) at 08:27

## 2024-05-30 RX ADMIN — LIDOCAINE HCL 2 PATCH: 28 GEL TOPICAL at 07:25

## 2024-05-30 RX ADMIN — LIDOCAINE HCL 2 PATCH: 28 GEL TOPICAL at 10:26

## 2024-05-30 RX ADMIN — PREGABALIN 100 MILLIGRAM(S): 50 CAPSULE ORAL at 14:29

## 2024-05-30 RX ADMIN — Medication 100 MILLIGRAM(S): at 06:28

## 2024-05-30 RX ADMIN — Medication 1 TABLET(S): at 11:37

## 2024-05-30 RX ADMIN — MONTELUKAST SODIUM 10 MILLIGRAM(S): 10 TABLET, FILM COATED ORAL at 22:32

## 2024-05-30 RX ADMIN — HEPARIN SODIUM 5000 UNIT(S): 50 INJECTION, SOLUTION INTRAVENOUS at 22:32

## 2024-05-30 RX ADMIN — Medication 800 MILLIGRAM(S): at 22:31

## 2024-05-30 RX ADMIN — ESCITALOPRAM OXALATE 10 MILLIGRAM(S): 20 TABLET, FILM COATED ORAL at 11:37

## 2024-05-30 RX ADMIN — OXYCODONE HYDROCHLORIDE 10 MILLIGRAM(S): 100 SOLUTION ORAL at 09:00

## 2024-05-30 RX ADMIN — OXYCODONE HYDROCHLORIDE 10 MILLIGRAM(S): 100 SOLUTION ORAL at 08:17

## 2024-05-30 RX ADMIN — Medication 100 MILLIGRAM(S): at 17:31

## 2024-05-30 RX ADMIN — Medication 220 MILLIGRAM(S): at 11:37

## 2024-05-30 RX ADMIN — Medication 15 MILLIGRAM(S): at 21:05

## 2024-05-30 RX ADMIN — POLYETHYLENE GLYCOL 3350 17 GRAM(S): 1 POWDER ORAL at 11:38

## 2024-05-30 RX ADMIN — FLUTICASONE PROPIONATE 2 SPRAY(S): 50 SPRAY, METERED NASAL at 06:28

## 2024-05-30 RX ADMIN — Medication 800 MILLIGRAM(S): at 06:27

## 2024-05-30 RX ADMIN — Medication 800 MILLIGRAM(S): at 14:29

## 2024-05-30 RX ADMIN — PREGABALIN 100 MILLIGRAM(S): 50 CAPSULE ORAL at 06:27

## 2024-05-30 RX ADMIN — HEPARIN SODIUM 5000 UNIT(S): 50 INJECTION, SOLUTION INTRAVENOUS at 11:37

## 2024-05-30 RX ADMIN — PREGABALIN 100 MILLIGRAM(S): 50 CAPSULE ORAL at 23:19

## 2024-05-31 PROCEDURE — 99232 SBSQ HOSP IP/OBS MODERATE 35: CPT

## 2024-05-31 PROCEDURE — 64425 NJX AA&/STRD II IH NERVES: CPT | Mod: RT

## 2024-05-31 PROCEDURE — 99233 SBSQ HOSP IP/OBS HIGH 50: CPT | Mod: 25

## 2024-05-31 RX ORDER — ACETAMINOPHEN 325 MG
975 TABLET ORAL EVERY 6 HOURS
Refills: 0 | Status: DISCONTINUED | OUTPATIENT
Start: 2024-05-31 | End: 2024-06-21

## 2024-05-31 RX ORDER — GABAPENTIN
600 POWDER (GRAM) MISCELLANEOUS THREE TIMES A DAY
Refills: 0 | Status: DISCONTINUED | OUTPATIENT
Start: 2024-05-31 | End: 2024-06-08

## 2024-05-31 RX ORDER — OXYCODONE HYDROCHLORIDE 100 MG/5ML
5 SOLUTION ORAL EVERY 8 HOURS
Refills: 0 | Status: DISCONTINUED | OUTPATIENT
Start: 2024-05-31 | End: 2024-06-05

## 2024-05-31 RX ORDER — LIDOCAINE HYDROCHLORIDE 20 MG/ML
5 INJECTION, SOLUTION EPIDURAL; INFILTRATION; INTRACAUDAL; PERINEURAL ONCE
Refills: 0 | Status: DISCONTINUED | OUTPATIENT
Start: 2024-05-31 | End: 2024-05-31

## 2024-05-31 RX ORDER — TRAMADOL HYDROCHLORIDE 50 MG/1
50 TABLET, FILM COATED ORAL EVERY 6 HOURS
Refills: 0 | Status: DISCONTINUED | OUTPATIENT
Start: 2024-05-31 | End: 2024-06-04

## 2024-05-31 RX ORDER — LIDOCAINE HYDROCHLORIDE 20 MG/ML
5 INJECTION, SOLUTION EPIDURAL; INFILTRATION; INTRACAUDAL; PERINEURAL ONCE
Refills: 0 | Status: DISCONTINUED | OUTPATIENT
Start: 2024-05-31 | End: 2024-06-21

## 2024-05-31 RX ORDER — BUPIVACAINE HYDROCHLORIDE 5 MG/ML
5 INJECTION, SOLUTION EPIDURAL; INTRACAUDAL; PERINEURAL ONCE
Refills: 0 | Status: DISCONTINUED | OUTPATIENT
Start: 2024-05-31 | End: 2024-06-21

## 2024-05-31 RX ADMIN — Medication 600 MILLIGRAM(S): at 14:27

## 2024-05-31 RX ADMIN — FLUTICASONE PROPIONATE 2 SPRAY(S): 50 SPRAY, METERED NASAL at 06:33

## 2024-05-31 RX ADMIN — OXYCODONE HYDROCHLORIDE 10 MILLIGRAM(S): 100 SOLUTION ORAL at 09:36

## 2024-05-31 RX ADMIN — LIDOCAINE HCL 2 PATCH: 28 GEL TOPICAL at 07:37

## 2024-05-31 RX ADMIN — HEPARIN SODIUM 5000 UNIT(S): 50 INJECTION, SOLUTION INTRAVENOUS at 21:28

## 2024-05-31 RX ADMIN — Medication 15 MILLIGRAM(S): at 06:32

## 2024-05-31 RX ADMIN — PREGABALIN 100 MILLIGRAM(S): 50 CAPSULE ORAL at 14:26

## 2024-05-31 RX ADMIN — Medication 180 MILLIGRAM(S): at 11:25

## 2024-05-31 RX ADMIN — Medication 15 MILLIGRAM(S): at 14:33

## 2024-05-31 RX ADMIN — Medication 220 MILLIGRAM(S): at 11:24

## 2024-05-31 RX ADMIN — TRAMADOL HYDROCHLORIDE 50 MILLIGRAM(S): 50 TABLET, FILM COATED ORAL at 17:54

## 2024-05-31 RX ADMIN — FLUTICASONE FUROATE, UMECLIDINIUM BROMIDE AND VILANTEROL TRIFENATATE 1 PUFF(S): 200; 62.5; 25 POWDER RESPIRATORY (INHALATION) at 08:04

## 2024-05-31 RX ADMIN — Medication 100 MILLIGRAM(S): at 06:33

## 2024-05-31 RX ADMIN — ESCITALOPRAM OXALATE 10 MILLIGRAM(S): 20 TABLET, FILM COATED ORAL at 11:24

## 2024-05-31 RX ADMIN — OXYCODONE HYDROCHLORIDE 10 MILLIGRAM(S): 100 SOLUTION ORAL at 10:15

## 2024-05-31 RX ADMIN — TRAMADOL HYDROCHLORIDE 50 MILLIGRAM(S): 50 TABLET, FILM COATED ORAL at 23:08

## 2024-05-31 RX ADMIN — Medication 800 MILLIGRAM(S): at 06:33

## 2024-05-31 RX ADMIN — HEPARIN SODIUM 5000 UNIT(S): 50 INJECTION, SOLUTION INTRAVENOUS at 11:25

## 2024-05-31 RX ADMIN — MONTELUKAST SODIUM 10 MILLIGRAM(S): 10 TABLET, FILM COATED ORAL at 21:28

## 2024-05-31 RX ADMIN — Medication 100 MILLIGRAM(S): at 17:54

## 2024-05-31 RX ADMIN — PREGABALIN 100 MILLIGRAM(S): 50 CAPSULE ORAL at 06:33

## 2024-05-31 RX ADMIN — Medication 600 MILLIGRAM(S): at 21:28

## 2024-05-31 RX ADMIN — PREGABALIN 100 MILLIGRAM(S): 50 CAPSULE ORAL at 21:29

## 2024-05-31 RX ADMIN — Medication 15 MILLIGRAM(S): at 21:28

## 2024-05-31 RX ADMIN — Medication 600 MILLIGRAM(S): at 23:08

## 2024-05-31 RX ADMIN — LIDOCAINE HCL 2 PATCH: 28 GEL TOPICAL at 09:20

## 2024-05-31 RX ADMIN — Medication 600 MILLIGRAM(S): at 17:54

## 2024-05-31 RX ADMIN — Medication 1 TABLET(S): at 11:24

## 2024-05-31 RX ADMIN — Medication 2 TABLET(S): at 21:28

## 2024-05-31 RX ADMIN — LIDOCAINE HCL 2 PATCH: 28 GEL TOPICAL at 21:29

## 2024-06-01 LAB
APPEARANCE UR: CLEAR — SIGNIFICANT CHANGE UP
BACTERIA # UR AUTO: ABNORMAL /HPF
BILIRUB UR-MCNC: NEGATIVE — SIGNIFICANT CHANGE UP
COLOR SPEC: SIGNIFICANT CHANGE UP
COMMENT - URINE: SIGNIFICANT CHANGE UP
CRP SERPL-MCNC: 82 MG/L — HIGH
DIFF PNL FLD: ABNORMAL
ERYTHROCYTE [SEDIMENTATION RATE] IN BLOOD: 74 MM/HR — HIGH (ref 0–20)
GLUCOSE UR QL: NEGATIVE MG/DL — SIGNIFICANT CHANGE UP
KETONES UR-MCNC: NEGATIVE MG/DL — SIGNIFICANT CHANGE UP
LEUKOCYTE ESTERASE UR-ACNC: NEGATIVE — SIGNIFICANT CHANGE UP
NITRITE UR-MCNC: NEGATIVE — SIGNIFICANT CHANGE UP
PH UR: 5.5 — SIGNIFICANT CHANGE UP (ref 5–8)
PROT UR-MCNC: SIGNIFICANT CHANGE UP MG/DL
RBC CASTS # UR COMP ASSIST: 6 /HPF — HIGH (ref 0–4)
SP GR SPEC: 1.03 — HIGH (ref 1–1.03)
UROBILINOGEN FLD QL: 0.2 MG/DL — SIGNIFICANT CHANGE UP (ref 0.2–1)
WBC UR QL: 1 /HPF — SIGNIFICANT CHANGE UP (ref 0–5)

## 2024-06-01 PROCEDURE — 72131 CT LUMBAR SPINE W/O DYE: CPT | Mod: 26

## 2024-06-01 PROCEDURE — 99232 SBSQ HOSP IP/OBS MODERATE 35: CPT

## 2024-06-01 RX ADMIN — Medication 600 MILLIGRAM(S): at 14:18

## 2024-06-01 RX ADMIN — LIDOCAINE HCL 2 PATCH: 28 GEL TOPICAL at 21:47

## 2024-06-01 RX ADMIN — TRAMADOL HYDROCHLORIDE 50 MILLIGRAM(S): 50 TABLET, FILM COATED ORAL at 23:04

## 2024-06-01 RX ADMIN — TRAMADOL HYDROCHLORIDE 50 MILLIGRAM(S): 50 TABLET, FILM COATED ORAL at 17:42

## 2024-06-01 RX ADMIN — TRAMADOL HYDROCHLORIDE 50 MILLIGRAM(S): 50 TABLET, FILM COATED ORAL at 18:27

## 2024-06-01 RX ADMIN — Medication 2 TABLET(S): at 21:36

## 2024-06-01 RX ADMIN — HEPARIN SODIUM 5000 UNIT(S): 50 INJECTION, SOLUTION INTRAVENOUS at 21:36

## 2024-06-01 RX ADMIN — TRAMADOL HYDROCHLORIDE 50 MILLIGRAM(S): 50 TABLET, FILM COATED ORAL at 06:16

## 2024-06-01 RX ADMIN — ESCITALOPRAM OXALATE 10 MILLIGRAM(S): 20 TABLET, FILM COATED ORAL at 11:44

## 2024-06-01 RX ADMIN — Medication 180 MILLIGRAM(S): at 11:45

## 2024-06-01 RX ADMIN — HEPARIN SODIUM 5000 UNIT(S): 50 INJECTION, SOLUTION INTRAVENOUS at 09:59

## 2024-06-01 RX ADMIN — MONTELUKAST SODIUM 10 MILLIGRAM(S): 10 TABLET, FILM COATED ORAL at 21:36

## 2024-06-01 RX ADMIN — Medication 600 MILLIGRAM(S): at 23:04

## 2024-06-01 RX ADMIN — TRAMADOL HYDROCHLORIDE 50 MILLIGRAM(S): 50 TABLET, FILM COATED ORAL at 12:53

## 2024-06-01 RX ADMIN — LIDOCAINE HCL 2 PATCH: 28 GEL TOPICAL at 09:46

## 2024-06-01 RX ADMIN — Medication 15 MILLIGRAM(S): at 06:16

## 2024-06-01 RX ADMIN — Medication 600 MILLIGRAM(S): at 06:38

## 2024-06-01 RX ADMIN — FLUTICASONE PROPIONATE 2 SPRAY(S): 50 SPRAY, METERED NASAL at 06:17

## 2024-06-01 RX ADMIN — Medication 600 MILLIGRAM(S): at 18:28

## 2024-06-01 RX ADMIN — TRAMADOL HYDROCHLORIDE 50 MILLIGRAM(S): 50 TABLET, FILM COATED ORAL at 00:12

## 2024-06-01 RX ADMIN — Medication 1 TABLET(S): at 11:44

## 2024-06-01 RX ADMIN — Medication 100 MILLIGRAM(S): at 06:17

## 2024-06-01 RX ADMIN — PREGABALIN 100 MILLIGRAM(S): 50 CAPSULE ORAL at 06:16

## 2024-06-01 RX ADMIN — Medication 600 MILLIGRAM(S): at 21:36

## 2024-06-01 RX ADMIN — TRAMADOL HYDROCHLORIDE 50 MILLIGRAM(S): 50 TABLET, FILM COATED ORAL at 11:44

## 2024-06-01 RX ADMIN — Medication 600 MILLIGRAM(S): at 06:16

## 2024-06-01 RX ADMIN — Medication 600 MILLIGRAM(S): at 00:12

## 2024-06-01 RX ADMIN — Medication 220 MILLIGRAM(S): at 11:45

## 2024-06-01 RX ADMIN — Medication 15 MILLIGRAM(S): at 21:36

## 2024-06-01 RX ADMIN — ERGOCALCIFEROL 50000 UNIT(S): 1.25 CAPSULE ORAL at 11:44

## 2024-06-01 RX ADMIN — LIDOCAINE HCL 2 PATCH: 28 GEL TOPICAL at 07:55

## 2024-06-01 RX ADMIN — Medication 600 MILLIGRAM(S): at 12:53

## 2024-06-01 RX ADMIN — Medication 600 MILLIGRAM(S): at 11:44

## 2024-06-01 RX ADMIN — Medication 15 MILLIGRAM(S): at 14:17

## 2024-06-01 RX ADMIN — PREGABALIN 100 MILLIGRAM(S): 50 CAPSULE ORAL at 21:36

## 2024-06-01 RX ADMIN — TRAMADOL HYDROCHLORIDE 50 MILLIGRAM(S): 50 TABLET, FILM COATED ORAL at 06:38

## 2024-06-01 RX ADMIN — Medication 100 MILLIGRAM(S): at 17:42

## 2024-06-01 RX ADMIN — PREGABALIN 100 MILLIGRAM(S): 50 CAPSULE ORAL at 14:18

## 2024-06-01 RX ADMIN — Medication 600 MILLIGRAM(S): at 17:40

## 2024-06-02 LAB
ALBUMIN SERPL ELPH-MCNC: 2.6 G/DL — LOW (ref 3.3–5)
ALP SERPL-CCNC: 103 U/L — SIGNIFICANT CHANGE UP (ref 40–120)
ALT FLD-CCNC: 13 U/L — SIGNIFICANT CHANGE UP (ref 10–45)
ANION GAP SERPL CALC-SCNC: 8 MMOL/L — SIGNIFICANT CHANGE UP (ref 5–17)
APTT BLD: 28 SEC — SIGNIFICANT CHANGE UP (ref 24.5–35.6)
AST SERPL-CCNC: 15 U/L — SIGNIFICANT CHANGE UP (ref 10–40)
BILIRUB SERPL-MCNC: 0.3 MG/DL — SIGNIFICANT CHANGE UP (ref 0.2–1.2)
BLD GP AB SCN SERPL QL: SIGNIFICANT CHANGE UP
BUN SERPL-MCNC: 23 MG/DL — SIGNIFICANT CHANGE UP (ref 7–23)
CALCIUM SERPL-MCNC: 9.2 MG/DL — SIGNIFICANT CHANGE UP (ref 8.4–10.5)
CHLORIDE SERPL-SCNC: 107 MMOL/L — SIGNIFICANT CHANGE UP (ref 96–108)
CO2 SERPL-SCNC: 28 MMOL/L — SIGNIFICANT CHANGE UP (ref 22–31)
CREAT SERPL-MCNC: 1 MG/DL — SIGNIFICANT CHANGE UP (ref 0.5–1.3)
EGFR: 91 ML/MIN/1.73M2 — SIGNIFICANT CHANGE UP
GLUCOSE SERPL-MCNC: 96 MG/DL — SIGNIFICANT CHANGE UP (ref 70–99)
HCT VFR BLD CALC: 31.4 % — LOW (ref 39–50)
HGB BLD-MCNC: 10.2 G/DL — LOW (ref 13–17)
INR BLD: 1.14 RATIO — SIGNIFICANT CHANGE UP (ref 0.85–1.18)
MAGNESIUM SERPL-MCNC: 2.1 MG/DL — SIGNIFICANT CHANGE UP (ref 1.6–2.6)
MCHC RBC-ENTMCNC: 30.3 PG — SIGNIFICANT CHANGE UP (ref 27–34)
MCHC RBC-ENTMCNC: 32.5 GM/DL — SIGNIFICANT CHANGE UP (ref 32–36)
MCV RBC AUTO: 93.2 FL — SIGNIFICANT CHANGE UP (ref 80–100)
NRBC # BLD: 0 /100 WBCS — SIGNIFICANT CHANGE UP (ref 0–0)
PHOSPHATE SERPL-MCNC: 5.5 MG/DL — HIGH (ref 2.5–4.5)
PLATELET # BLD AUTO: 246 K/UL — SIGNIFICANT CHANGE UP (ref 150–400)
POTASSIUM SERPL-MCNC: 3.9 MMOL/L — SIGNIFICANT CHANGE UP (ref 3.5–5.3)
POTASSIUM SERPL-SCNC: 3.9 MMOL/L — SIGNIFICANT CHANGE UP (ref 3.5–5.3)
PROT SERPL-MCNC: 8 G/DL — SIGNIFICANT CHANGE UP (ref 6–8.3)
PROTHROM AB SERPL-ACNC: 13.3 SEC — HIGH (ref 9.5–13)
RBC # BLD: 3.37 M/UL — LOW (ref 4.2–5.8)
RBC # FLD: 16.1 % — HIGH (ref 10.3–14.5)
SODIUM SERPL-SCNC: 143 MMOL/L — SIGNIFICANT CHANGE UP (ref 135–145)
WBC # BLD: 7.23 K/UL — SIGNIFICANT CHANGE UP (ref 3.8–10.5)
WBC # FLD AUTO: 7.23 K/UL — SIGNIFICANT CHANGE UP (ref 3.8–10.5)

## 2024-06-02 PROCEDURE — 99232 SBSQ HOSP IP/OBS MODERATE 35: CPT

## 2024-06-02 PROCEDURE — 74176 CT ABD & PELVIS W/O CONTRAST: CPT | Mod: 26

## 2024-06-02 RX ORDER — ENOXAPARIN SODIUM 100 MG/ML
40 INJECTION SUBCUTANEOUS EVERY 24 HOURS
Refills: 0 | Status: DISCONTINUED | OUTPATIENT
Start: 2024-06-02 | End: 2024-06-02

## 2024-06-02 RX ADMIN — Medication 15 MILLIGRAM(S): at 14:25

## 2024-06-02 RX ADMIN — Medication 975 MILLIGRAM(S): at 08:44

## 2024-06-02 RX ADMIN — MONTELUKAST SODIUM 10 MILLIGRAM(S): 10 TABLET, FILM COATED ORAL at 21:15

## 2024-06-02 RX ADMIN — TRAMADOL HYDROCHLORIDE 50 MILLIGRAM(S): 50 TABLET, FILM COATED ORAL at 11:34

## 2024-06-02 RX ADMIN — TRAMADOL HYDROCHLORIDE 50 MILLIGRAM(S): 50 TABLET, FILM COATED ORAL at 00:24

## 2024-06-02 RX ADMIN — Medication 975 MILLIGRAM(S): at 07:43

## 2024-06-02 RX ADMIN — Medication 100 MILLIGRAM(S): at 17:39

## 2024-06-02 RX ADMIN — TRAMADOL HYDROCHLORIDE 50 MILLIGRAM(S): 50 TABLET, FILM COATED ORAL at 12:31

## 2024-06-02 RX ADMIN — Medication 15 MILLIGRAM(S): at 05:49

## 2024-06-02 RX ADMIN — Medication 100 MILLIGRAM(S): at 05:48

## 2024-06-02 RX ADMIN — LIDOCAINE HCL 2 PATCH: 28 GEL TOPICAL at 21:15

## 2024-06-02 RX ADMIN — Medication 600 MILLIGRAM(S): at 12:30

## 2024-06-02 RX ADMIN — Medication 600 MILLIGRAM(S): at 17:39

## 2024-06-02 RX ADMIN — PREGABALIN 100 MILLIGRAM(S): 50 CAPSULE ORAL at 21:15

## 2024-06-02 RX ADMIN — Medication 600 MILLIGRAM(S): at 11:35

## 2024-06-02 RX ADMIN — TRAMADOL HYDROCHLORIDE 50 MILLIGRAM(S): 50 TABLET, FILM COATED ORAL at 06:06

## 2024-06-02 RX ADMIN — Medication 600 MILLIGRAM(S): at 14:24

## 2024-06-02 RX ADMIN — ESCITALOPRAM OXALATE 10 MILLIGRAM(S): 20 TABLET, FILM COATED ORAL at 11:35

## 2024-06-02 RX ADMIN — Medication 600 MILLIGRAM(S): at 06:06

## 2024-06-02 RX ADMIN — FLUTICASONE PROPIONATE 2 SPRAY(S): 50 SPRAY, METERED NASAL at 05:50

## 2024-06-02 RX ADMIN — LIDOCAINE HCL 2 PATCH: 28 GEL TOPICAL at 10:08

## 2024-06-02 RX ADMIN — PREGABALIN 100 MILLIGRAM(S): 50 CAPSULE ORAL at 05:49

## 2024-06-02 RX ADMIN — Medication 600 MILLIGRAM(S): at 21:15

## 2024-06-02 RX ADMIN — Medication 600 MILLIGRAM(S): at 05:49

## 2024-06-02 RX ADMIN — Medication 15 MILLIGRAM(S): at 21:15

## 2024-06-02 RX ADMIN — TRAMADOL HYDROCHLORIDE 50 MILLIGRAM(S): 50 TABLET, FILM COATED ORAL at 05:49

## 2024-06-02 RX ADMIN — Medication 180 MILLIGRAM(S): at 11:36

## 2024-06-02 RX ADMIN — TRAMADOL HYDROCHLORIDE 50 MILLIGRAM(S): 50 TABLET, FILM COATED ORAL at 17:39

## 2024-06-02 RX ADMIN — Medication 600 MILLIGRAM(S): at 18:33

## 2024-06-02 RX ADMIN — Medication 1 TABLET(S): at 11:35

## 2024-06-02 RX ADMIN — Medication 220 MILLIGRAM(S): at 11:35

## 2024-06-02 RX ADMIN — PREGABALIN 100 MILLIGRAM(S): 50 CAPSULE ORAL at 14:24

## 2024-06-02 RX ADMIN — Medication 600 MILLIGRAM(S): at 00:23

## 2024-06-02 RX ADMIN — LIDOCAINE HCL 2 PATCH: 28 GEL TOPICAL at 07:41

## 2024-06-03 LAB
ANION GAP SERPL CALC-SCNC: 9 MMOL/L — SIGNIFICANT CHANGE UP (ref 5–17)
BUN SERPL-MCNC: 21 MG/DL — SIGNIFICANT CHANGE UP (ref 7–23)
CALCIUM SERPL-MCNC: 9.1 MG/DL — SIGNIFICANT CHANGE UP (ref 8.4–10.5)
CHLORIDE SERPL-SCNC: 109 MMOL/L — HIGH (ref 96–108)
CO2 SERPL-SCNC: 25 MMOL/L — SIGNIFICANT CHANGE UP (ref 22–31)
CREAT SERPL-MCNC: 0.84 MG/DL — SIGNIFICANT CHANGE UP (ref 0.5–1.3)
EGFR: 104 ML/MIN/1.73M2 — SIGNIFICANT CHANGE UP
GLUCOSE SERPL-MCNC: 94 MG/DL — SIGNIFICANT CHANGE UP (ref 70–99)
HCT VFR BLD CALC: 32.2 % — LOW (ref 39–50)
HGB BLD-MCNC: 10.7 G/DL — LOW (ref 13–17)
MCHC RBC-ENTMCNC: 31.3 PG — SIGNIFICANT CHANGE UP (ref 27–34)
MCHC RBC-ENTMCNC: 33.2 GM/DL — SIGNIFICANT CHANGE UP (ref 32–36)
MCV RBC AUTO: 94.2 FL — SIGNIFICANT CHANGE UP (ref 80–100)
NRBC # BLD: 0 /100 WBCS — SIGNIFICANT CHANGE UP (ref 0–0)
PLATELET # BLD AUTO: 212 K/UL — SIGNIFICANT CHANGE UP (ref 150–400)
POTASSIUM SERPL-MCNC: 4.1 MMOL/L — SIGNIFICANT CHANGE UP (ref 3.5–5.3)
POTASSIUM SERPL-SCNC: 4.1 MMOL/L — SIGNIFICANT CHANGE UP (ref 3.5–5.3)
RBC # BLD: 3.42 M/UL — LOW (ref 4.2–5.8)
RBC # FLD: 16 % — HIGH (ref 10.3–14.5)
SODIUM SERPL-SCNC: 143 MMOL/L — SIGNIFICANT CHANGE UP (ref 135–145)
WBC # BLD: 7.19 K/UL — SIGNIFICANT CHANGE UP (ref 3.8–10.5)
WBC # FLD AUTO: 7.19 K/UL — SIGNIFICANT CHANGE UP (ref 3.8–10.5)

## 2024-06-03 PROCEDURE — 99221 1ST HOSP IP/OBS SF/LOW 40: CPT

## 2024-06-03 PROCEDURE — 99232 SBSQ HOSP IP/OBS MODERATE 35: CPT

## 2024-06-03 RX ORDER — PREGABALIN 50 MG/1
100 CAPSULE ORAL THREE TIMES A DAY
Refills: 0 | Status: DISCONTINUED | OUTPATIENT
Start: 2024-06-03 | End: 2024-06-08

## 2024-06-03 RX ORDER — DIPHENHYDRAMINE HCL 12.5MG/5ML
25 ELIXIR ORAL ONCE
Refills: 0 | Status: COMPLETED | OUTPATIENT
Start: 2024-06-03 | End: 2024-06-04

## 2024-06-03 RX ADMIN — TRAMADOL HYDROCHLORIDE 50 MILLIGRAM(S): 50 TABLET, FILM COATED ORAL at 12:05

## 2024-06-03 RX ADMIN — PREGABALIN 100 MILLIGRAM(S): 50 CAPSULE ORAL at 06:19

## 2024-06-03 RX ADMIN — LIDOCAINE HCL 2 PATCH: 28 GEL TOPICAL at 21:20

## 2024-06-03 RX ADMIN — Medication 600 MILLIGRAM(S): at 13:00

## 2024-06-03 RX ADMIN — Medication 600 MILLIGRAM(S): at 06:49

## 2024-06-03 RX ADMIN — LIDOCAINE HCL 2 PATCH: 28 GEL TOPICAL at 07:41

## 2024-06-03 RX ADMIN — LIDOCAINE HCL 2 PATCH: 28 GEL TOPICAL at 09:42

## 2024-06-03 RX ADMIN — FLUTICASONE FUROATE, UMECLIDINIUM BROMIDE AND VILANTEROL TRIFENATATE 1 PUFF(S): 200; 62.5; 25 POWDER RESPIRATORY (INHALATION) at 08:37

## 2024-06-03 RX ADMIN — TRAMADOL HYDROCHLORIDE 50 MILLIGRAM(S): 50 TABLET, FILM COATED ORAL at 06:49

## 2024-06-03 RX ADMIN — ESCITALOPRAM OXALATE 10 MILLIGRAM(S): 20 TABLET, FILM COATED ORAL at 12:06

## 2024-06-03 RX ADMIN — Medication 1 TABLET(S): at 12:06

## 2024-06-03 RX ADMIN — Medication 15 MILLIGRAM(S): at 06:19

## 2024-06-03 RX ADMIN — Medication 220 MILLIGRAM(S): at 12:06

## 2024-06-03 RX ADMIN — Medication 2 TABLET(S): at 21:19

## 2024-06-03 RX ADMIN — TRAMADOL HYDROCHLORIDE 50 MILLIGRAM(S): 50 TABLET, FILM COATED ORAL at 13:00

## 2024-06-03 RX ADMIN — Medication 600 MILLIGRAM(S): at 06:19

## 2024-06-03 RX ADMIN — Medication 600 MILLIGRAM(S): at 21:20

## 2024-06-03 RX ADMIN — TRAMADOL HYDROCHLORIDE 50 MILLIGRAM(S): 50 TABLET, FILM COATED ORAL at 01:02

## 2024-06-03 RX ADMIN — PREGABALIN 100 MILLIGRAM(S): 50 CAPSULE ORAL at 15:01

## 2024-06-03 RX ADMIN — Medication 15 MILLIGRAM(S): at 15:01

## 2024-06-03 RX ADMIN — PREGABALIN 100 MILLIGRAM(S): 50 CAPSULE ORAL at 21:19

## 2024-06-03 RX ADMIN — Medication 600 MILLIGRAM(S): at 01:02

## 2024-06-03 RX ADMIN — Medication 600 MILLIGRAM(S): at 18:15

## 2024-06-03 RX ADMIN — Medication 600 MILLIGRAM(S): at 23:03

## 2024-06-03 RX ADMIN — FLUTICASONE PROPIONATE 2 SPRAY(S): 50 SPRAY, METERED NASAL at 06:19

## 2024-06-03 RX ADMIN — Medication 15 MILLIGRAM(S): at 21:19

## 2024-06-03 RX ADMIN — Medication 600 MILLIGRAM(S): at 00:02

## 2024-06-03 RX ADMIN — Medication 180 MILLIGRAM(S): at 12:07

## 2024-06-03 RX ADMIN — Medication 600 MILLIGRAM(S): at 12:08

## 2024-06-03 RX ADMIN — Medication 100 MILLIGRAM(S): at 06:20

## 2024-06-03 RX ADMIN — Medication 600 MILLIGRAM(S): at 18:42

## 2024-06-03 RX ADMIN — TRAMADOL HYDROCHLORIDE 50 MILLIGRAM(S): 50 TABLET, FILM COATED ORAL at 06:19

## 2024-06-03 RX ADMIN — MONTELUKAST SODIUM 10 MILLIGRAM(S): 10 TABLET, FILM COATED ORAL at 21:19

## 2024-06-03 RX ADMIN — TRAMADOL HYDROCHLORIDE 50 MILLIGRAM(S): 50 TABLET, FILM COATED ORAL at 00:02

## 2024-06-03 RX ADMIN — Medication 100 MILLIGRAM(S): at 18:14

## 2024-06-03 RX ADMIN — Medication 600 MILLIGRAM(S): at 15:01

## 2024-06-04 LAB — CRP SERPL-MCNC: 64 MG/L — HIGH

## 2024-06-04 PROCEDURE — 99232 SBSQ HOSP IP/OBS MODERATE 35: CPT

## 2024-06-04 PROCEDURE — 72158 MRI LUMBAR SPINE W/O & W/DYE: CPT | Mod: 26

## 2024-06-04 RX ADMIN — PREGABALIN 100 MILLIGRAM(S): 50 CAPSULE ORAL at 21:01

## 2024-06-04 RX ADMIN — Medication 600 MILLIGRAM(S): at 14:15

## 2024-06-04 RX ADMIN — ESCITALOPRAM OXALATE 10 MILLIGRAM(S): 20 TABLET, FILM COATED ORAL at 12:10

## 2024-06-04 RX ADMIN — Medication 600 MILLIGRAM(S): at 05:13

## 2024-06-04 RX ADMIN — Medication 15 MILLIGRAM(S): at 14:15

## 2024-06-04 RX ADMIN — OXYCODONE HYDROCHLORIDE 5 MILLIGRAM(S): 100 SOLUTION ORAL at 10:33

## 2024-06-04 RX ADMIN — Medication 100 MILLIGRAM(S): at 05:15

## 2024-06-04 RX ADMIN — LIDOCAINE HCL 2 PATCH: 28 GEL TOPICAL at 07:59

## 2024-06-04 RX ADMIN — Medication 600 MILLIGRAM(S): at 12:09

## 2024-06-04 RX ADMIN — Medication 600 MILLIGRAM(S): at 06:18

## 2024-06-04 RX ADMIN — Medication 1 TABLET(S): at 12:10

## 2024-06-04 RX ADMIN — PREGABALIN 100 MILLIGRAM(S): 50 CAPSULE ORAL at 05:14

## 2024-06-04 RX ADMIN — Medication 600 MILLIGRAM(S): at 21:01

## 2024-06-04 RX ADMIN — Medication 220 MILLIGRAM(S): at 12:11

## 2024-06-04 RX ADMIN — FLUTICASONE FUROATE, UMECLIDINIUM BROMIDE AND VILANTEROL TRIFENATATE 1 PUFF(S): 200; 62.5; 25 POWDER RESPIRATORY (INHALATION) at 08:47

## 2024-06-04 RX ADMIN — Medication 600 MILLIGRAM(S): at 00:27

## 2024-06-04 RX ADMIN — Medication 180 MILLIGRAM(S): at 12:12

## 2024-06-04 RX ADMIN — MONTELUKAST SODIUM 10 MILLIGRAM(S): 10 TABLET, FILM COATED ORAL at 21:01

## 2024-06-04 RX ADMIN — Medication 15 MILLIGRAM(S): at 05:14

## 2024-06-04 RX ADMIN — Medication 100 MILLIGRAM(S): at 17:45

## 2024-06-04 RX ADMIN — OXYCODONE HYDROCHLORIDE 5 MILLIGRAM(S): 100 SOLUTION ORAL at 11:15

## 2024-06-04 RX ADMIN — Medication 15 MILLIGRAM(S): at 21:01

## 2024-06-04 RX ADMIN — Medication 25 MILLIGRAM(S): at 08:42

## 2024-06-04 RX ADMIN — Medication 2 TABLET(S): at 21:00

## 2024-06-04 RX ADMIN — Medication 600 MILLIGRAM(S): at 13:00

## 2024-06-04 RX ADMIN — Medication 600 MILLIGRAM(S): at 17:45

## 2024-06-04 RX ADMIN — LIDOCAINE HCL 2 PATCH: 28 GEL TOPICAL at 09:00

## 2024-06-04 RX ADMIN — Medication 600 MILLIGRAM(S): at 18:36

## 2024-06-04 RX ADMIN — FLUTICASONE PROPIONATE 2 SPRAY(S): 50 SPRAY, METERED NASAL at 05:14

## 2024-06-04 RX ADMIN — PREGABALIN 100 MILLIGRAM(S): 50 CAPSULE ORAL at 14:14

## 2024-06-04 RX ADMIN — Medication 600 MILLIGRAM(S): at 05:14

## 2024-06-05 LAB
ALBUMIN SERPL ELPH-MCNC: 2.7 G/DL — LOW (ref 3.3–5)
ALP SERPL-CCNC: 102 U/L — SIGNIFICANT CHANGE UP (ref 40–120)
ALT FLD-CCNC: 15 U/L — SIGNIFICANT CHANGE UP (ref 10–45)
ANION GAP SERPL CALC-SCNC: 13 MMOL/L — SIGNIFICANT CHANGE UP (ref 5–17)
AST SERPL-CCNC: 14 U/L — SIGNIFICANT CHANGE UP (ref 10–40)
BASOPHILS # BLD AUTO: 0.07 K/UL — SIGNIFICANT CHANGE UP (ref 0–0.2)
BASOPHILS NFR BLD AUTO: 0.9 % — SIGNIFICANT CHANGE UP (ref 0–2)
BILIRUB SERPL-MCNC: 0.3 MG/DL — SIGNIFICANT CHANGE UP (ref 0.2–1.2)
BUN SERPL-MCNC: 23 MG/DL — SIGNIFICANT CHANGE UP (ref 7–23)
CALCIUM SERPL-MCNC: 8.8 MG/DL — SIGNIFICANT CHANGE UP (ref 8.4–10.5)
CHLORIDE SERPL-SCNC: 106 MMOL/L — SIGNIFICANT CHANGE UP (ref 96–108)
CO2 SERPL-SCNC: 23 MMOL/L — SIGNIFICANT CHANGE UP (ref 22–31)
CREAT SERPL-MCNC: 0.9 MG/DL — SIGNIFICANT CHANGE UP (ref 0.5–1.3)
CRP SERPL-MCNC: 43 MG/L — HIGH
EGFR: 102 ML/MIN/1.73M2 — SIGNIFICANT CHANGE UP
EOSINOPHIL # BLD AUTO: 0.23 K/UL — SIGNIFICANT CHANGE UP (ref 0–0.5)
EOSINOPHIL NFR BLD AUTO: 2.9 % — SIGNIFICANT CHANGE UP (ref 0–6)
GLUCOSE SERPL-MCNC: 118 MG/DL — HIGH (ref 70–99)
HCT VFR BLD CALC: 29.3 % — LOW (ref 39–50)
HGB BLD-MCNC: 9.4 G/DL — LOW (ref 13–17)
IMM GRANULOCYTES NFR BLD AUTO: 1.7 % — HIGH (ref 0–0.9)
LYMPHOCYTES # BLD AUTO: 2.05 K/UL — SIGNIFICANT CHANGE UP (ref 1–3.3)
LYMPHOCYTES # BLD AUTO: 26.2 % — SIGNIFICANT CHANGE UP (ref 13–44)
MCHC RBC-ENTMCNC: 30.3 PG — SIGNIFICANT CHANGE UP (ref 27–34)
MCHC RBC-ENTMCNC: 32.1 GM/DL — SIGNIFICANT CHANGE UP (ref 32–36)
MCV RBC AUTO: 94.5 FL — SIGNIFICANT CHANGE UP (ref 80–100)
MONOCYTES # BLD AUTO: 0.64 K/UL — SIGNIFICANT CHANGE UP (ref 0–0.9)
MONOCYTES NFR BLD AUTO: 8.2 % — SIGNIFICANT CHANGE UP (ref 2–14)
NEUTROPHILS # BLD AUTO: 4.71 K/UL — SIGNIFICANT CHANGE UP (ref 1.8–7.4)
NEUTROPHILS NFR BLD AUTO: 60.1 % — SIGNIFICANT CHANGE UP (ref 43–77)
NRBC # BLD: 0 /100 WBCS — SIGNIFICANT CHANGE UP (ref 0–0)
PLATELET # BLD AUTO: 229 K/UL — SIGNIFICANT CHANGE UP (ref 150–400)
POTASSIUM SERPL-MCNC: 3.6 MMOL/L — SIGNIFICANT CHANGE UP (ref 3.5–5.3)
POTASSIUM SERPL-SCNC: 3.6 MMOL/L — SIGNIFICANT CHANGE UP (ref 3.5–5.3)
PROT SERPL-MCNC: 7.8 G/DL — SIGNIFICANT CHANGE UP (ref 6–8.3)
RBC # BLD: 3.1 M/UL — LOW (ref 4.2–5.8)
RBC # FLD: 15.8 % — HIGH (ref 10.3–14.5)
SODIUM SERPL-SCNC: 142 MMOL/L — SIGNIFICANT CHANGE UP (ref 135–145)
WBC # BLD: 7.83 K/UL — SIGNIFICANT CHANGE UP (ref 3.8–10.5)
WBC # FLD AUTO: 7.83 K/UL — SIGNIFICANT CHANGE UP (ref 3.8–10.5)

## 2024-06-05 PROCEDURE — 99232 SBSQ HOSP IP/OBS MODERATE 35: CPT

## 2024-06-05 PROCEDURE — 99233 SBSQ HOSP IP/OBS HIGH 50: CPT

## 2024-06-05 RX ORDER — OXYCODONE HYDROCHLORIDE 100 MG/5ML
5 SOLUTION ORAL EVERY 8 HOURS
Refills: 0 | Status: DISCONTINUED | OUTPATIENT
Start: 2024-06-05 | End: 2024-06-10

## 2024-06-05 RX ADMIN — Medication 975 MILLIGRAM(S): at 02:40

## 2024-06-05 RX ADMIN — Medication 600 MILLIGRAM(S): at 12:31

## 2024-06-05 RX ADMIN — Medication 600 MILLIGRAM(S): at 05:03

## 2024-06-05 RX ADMIN — Medication 220 MILLIGRAM(S): at 12:31

## 2024-06-05 RX ADMIN — PREGABALIN 100 MILLIGRAM(S): 50 CAPSULE ORAL at 21:47

## 2024-06-05 RX ADMIN — Medication 180 MILLIGRAM(S): at 12:30

## 2024-06-05 RX ADMIN — Medication 15 MILLIGRAM(S): at 14:22

## 2024-06-05 RX ADMIN — Medication 600 MILLIGRAM(S): at 14:22

## 2024-06-05 RX ADMIN — Medication 600 MILLIGRAM(S): at 06:26

## 2024-06-05 RX ADMIN — Medication 600 MILLIGRAM(S): at 21:47

## 2024-06-05 RX ADMIN — Medication 975 MILLIGRAM(S): at 03:20

## 2024-06-05 RX ADMIN — Medication 100 MILLIGRAM(S): at 18:15

## 2024-06-05 RX ADMIN — OXYCODONE HYDROCHLORIDE 5 MILLIGRAM(S): 100 SOLUTION ORAL at 06:46

## 2024-06-05 RX ADMIN — PREGABALIN 100 MILLIGRAM(S): 50 CAPSULE ORAL at 05:03

## 2024-06-05 RX ADMIN — FLUTICASONE PROPIONATE 2 SPRAY(S): 50 SPRAY, METERED NASAL at 05:04

## 2024-06-05 RX ADMIN — OXYCODONE HYDROCHLORIDE 5 MILLIGRAM(S): 100 SOLUTION ORAL at 07:02

## 2024-06-05 RX ADMIN — MONTELUKAST SODIUM 10 MILLIGRAM(S): 10 TABLET, FILM COATED ORAL at 21:47

## 2024-06-05 RX ADMIN — LIDOCAINE HCL 2 PATCH: 28 GEL TOPICAL at 21:49

## 2024-06-05 RX ADMIN — Medication 100 MILLIGRAM(S): at 05:04

## 2024-06-05 RX ADMIN — Medication 2 TABLET(S): at 21:47

## 2024-06-05 RX ADMIN — PREGABALIN 100 MILLIGRAM(S): 50 CAPSULE ORAL at 14:22

## 2024-06-05 RX ADMIN — Medication 600 MILLIGRAM(S): at 05:04

## 2024-06-05 RX ADMIN — Medication 15 MILLIGRAM(S): at 05:03

## 2024-06-05 RX ADMIN — Medication 1 TABLET(S): at 12:31

## 2024-06-05 RX ADMIN — Medication 15 MILLIGRAM(S): at 21:47

## 2024-06-06 LAB
ANION GAP SERPL CALC-SCNC: 10 MMOL/L — SIGNIFICANT CHANGE UP (ref 5–17)
BUN SERPL-MCNC: 20 MG/DL — SIGNIFICANT CHANGE UP (ref 7–23)
CALCIUM SERPL-MCNC: 9.1 MG/DL — SIGNIFICANT CHANGE UP (ref 8.4–10.5)
CHLORIDE SERPL-SCNC: 105 MMOL/L — SIGNIFICANT CHANGE UP (ref 96–108)
CO2 SERPL-SCNC: 26 MMOL/L — SIGNIFICANT CHANGE UP (ref 22–31)
CREAT SERPL-MCNC: 0.66 MG/DL — SIGNIFICANT CHANGE UP (ref 0.5–1.3)
EGFR: 112 ML/MIN/1.73M2 — SIGNIFICANT CHANGE UP
ERYTHROCYTE [SEDIMENTATION RATE] IN BLOOD: 74 MM/HR — HIGH (ref 0–20)
GLUCOSE SERPL-MCNC: 102 MG/DL — HIGH (ref 70–99)
HCT VFR BLD CALC: 31.6 % — LOW (ref 39–50)
HGB BLD-MCNC: 10.7 G/DL — LOW (ref 13–17)
MCHC RBC-ENTMCNC: 30.8 PG — SIGNIFICANT CHANGE UP (ref 27–34)
MCHC RBC-ENTMCNC: 33.9 GM/DL — SIGNIFICANT CHANGE UP (ref 32–36)
MCV RBC AUTO: 91.1 FL — SIGNIFICANT CHANGE UP (ref 80–100)
NRBC # BLD: 0 /100 WBCS — SIGNIFICANT CHANGE UP (ref 0–0)
PLATELET # BLD AUTO: 276 K/UL — SIGNIFICANT CHANGE UP (ref 150–400)
POTASSIUM SERPL-MCNC: 3.5 MMOL/L — SIGNIFICANT CHANGE UP (ref 3.5–5.3)
POTASSIUM SERPL-SCNC: 3.5 MMOL/L — SIGNIFICANT CHANGE UP (ref 3.5–5.3)
RBC # BLD: 3.47 M/UL — LOW (ref 4.2–5.8)
RBC # FLD: 15.4 % — HIGH (ref 10.3–14.5)
SODIUM SERPL-SCNC: 141 MMOL/L — SIGNIFICANT CHANGE UP (ref 135–145)
WBC # BLD: 7.11 K/UL — SIGNIFICANT CHANGE UP (ref 3.8–10.5)
WBC # FLD AUTO: 7.11 K/UL — SIGNIFICANT CHANGE UP (ref 3.8–10.5)

## 2024-06-06 PROCEDURE — 99232 SBSQ HOSP IP/OBS MODERATE 35: CPT

## 2024-06-06 RX ADMIN — FLUTICASONE PROPIONATE 2 SPRAY(S): 50 SPRAY, METERED NASAL at 06:14

## 2024-06-06 RX ADMIN — PREGABALIN 100 MILLIGRAM(S): 50 CAPSULE ORAL at 06:14

## 2024-06-06 RX ADMIN — Medication 600 MILLIGRAM(S): at 06:14

## 2024-06-06 RX ADMIN — Medication 600 MILLIGRAM(S): at 14:28

## 2024-06-06 RX ADMIN — LIDOCAINE HCL 2 PATCH: 28 GEL TOPICAL at 07:53

## 2024-06-06 RX ADMIN — Medication 600 MILLIGRAM(S): at 22:01

## 2024-06-06 RX ADMIN — Medication 15 MILLIGRAM(S): at 06:13

## 2024-06-06 RX ADMIN — Medication 15 MILLIGRAM(S): at 14:29

## 2024-06-06 RX ADMIN — LIDOCAINE HCL 2 PATCH: 28 GEL TOPICAL at 22:03

## 2024-06-06 RX ADMIN — Medication 2 TABLET(S): at 22:02

## 2024-06-06 RX ADMIN — PREGABALIN 100 MILLIGRAM(S): 50 CAPSULE ORAL at 22:01

## 2024-06-06 RX ADMIN — Medication 1 TABLET(S): at 12:19

## 2024-06-06 RX ADMIN — Medication 100 MILLIGRAM(S): at 17:32

## 2024-06-06 RX ADMIN — PREGABALIN 100 MILLIGRAM(S): 50 CAPSULE ORAL at 14:28

## 2024-06-06 RX ADMIN — ONDANSETRON HYDROCHLORIDE 4 MILLIGRAM(S): 2 INJECTION INTRAMUSCULAR; INTRAVENOUS at 13:08

## 2024-06-06 RX ADMIN — Medication 180 MILLIGRAM(S): at 12:20

## 2024-06-06 RX ADMIN — Medication 975 MILLIGRAM(S): at 12:18

## 2024-06-06 RX ADMIN — Medication 15 MILLIGRAM(S): at 22:02

## 2024-06-06 RX ADMIN — OXYCODONE HYDROCHLORIDE 5 MILLIGRAM(S): 100 SOLUTION ORAL at 08:24

## 2024-06-06 RX ADMIN — MONTELUKAST SODIUM 10 MILLIGRAM(S): 10 TABLET, FILM COATED ORAL at 22:02

## 2024-06-06 RX ADMIN — Medication 100 MILLIGRAM(S): at 06:13

## 2024-06-06 RX ADMIN — Medication 975 MILLIGRAM(S): at 06:24

## 2024-06-06 RX ADMIN — Medication 220 MILLIGRAM(S): at 12:19

## 2024-06-07 PROCEDURE — 99232 SBSQ HOSP IP/OBS MODERATE 35: CPT

## 2024-06-07 RX ADMIN — PREGABALIN 100 MILLIGRAM(S): 50 CAPSULE ORAL at 22:07

## 2024-06-07 RX ADMIN — Medication 600 MILLIGRAM(S): at 14:14

## 2024-06-07 RX ADMIN — FLUTICASONE PROPIONATE 2 SPRAY(S): 50 SPRAY, METERED NASAL at 08:10

## 2024-06-07 RX ADMIN — OXYCODONE HYDROCHLORIDE 5 MILLIGRAM(S): 100 SOLUTION ORAL at 01:41

## 2024-06-07 RX ADMIN — LIDOCAINE HCL 2 PATCH: 28 GEL TOPICAL at 11:48

## 2024-06-07 RX ADMIN — LIDOCAINE HCL 2 PATCH: 28 GEL TOPICAL at 22:05

## 2024-06-07 RX ADMIN — Medication 600 MILLIGRAM(S): at 05:31

## 2024-06-07 RX ADMIN — PREGABALIN 100 MILLIGRAM(S): 50 CAPSULE ORAL at 14:14

## 2024-06-07 RX ADMIN — Medication 15 MILLIGRAM(S): at 22:05

## 2024-06-07 RX ADMIN — OXYCODONE HYDROCHLORIDE 5 MILLIGRAM(S): 100 SOLUTION ORAL at 10:13

## 2024-06-07 RX ADMIN — Medication 100 MILLIGRAM(S): at 05:30

## 2024-06-07 RX ADMIN — Medication 2 TABLET(S): at 22:05

## 2024-06-07 RX ADMIN — Medication 180 MILLIGRAM(S): at 11:52

## 2024-06-07 RX ADMIN — Medication 975 MILLIGRAM(S): at 09:48

## 2024-06-07 RX ADMIN — Medication 15 MILLIGRAM(S): at 05:30

## 2024-06-07 RX ADMIN — LIDOCAINE HCL 2 PATCH: 28 GEL TOPICAL at 08:11

## 2024-06-07 RX ADMIN — Medication 100 MILLIGRAM(S): at 17:32

## 2024-06-07 RX ADMIN — PREGABALIN 100 MILLIGRAM(S): 50 CAPSULE ORAL at 05:31

## 2024-06-07 RX ADMIN — MONTELUKAST SODIUM 10 MILLIGRAM(S): 10 TABLET, FILM COATED ORAL at 22:05

## 2024-06-07 RX ADMIN — Medication 975 MILLIGRAM(S): at 08:21

## 2024-06-07 RX ADMIN — Medication 220 MILLIGRAM(S): at 11:50

## 2024-06-07 RX ADMIN — Medication 15 MILLIGRAM(S): at 14:15

## 2024-06-07 RX ADMIN — FLUTICASONE FUROATE, UMECLIDINIUM BROMIDE AND VILANTEROL TRIFENATATE 1 PUFF(S): 200; 62.5; 25 POWDER RESPIRATORY (INHALATION) at 08:06

## 2024-06-07 RX ADMIN — Medication 600 MILLIGRAM(S): at 22:06

## 2024-06-07 RX ADMIN — Medication 1 TABLET(S): at 11:50

## 2024-06-08 PROCEDURE — 99232 SBSQ HOSP IP/OBS MODERATE 35: CPT

## 2024-06-08 RX ORDER — BACLOFEN 20 MG
15 TABLET ORAL
Refills: 0 | Status: DISCONTINUED | OUTPATIENT
Start: 2024-06-08 | End: 2024-06-21

## 2024-06-08 RX ORDER — GABAPENTIN
600 POWDER (GRAM) MISCELLANEOUS
Refills: 0 | Status: DISCONTINUED | OUTPATIENT
Start: 2024-06-08 | End: 2024-06-21

## 2024-06-08 RX ORDER — PREGABALIN 50 MG/1
100 CAPSULE ORAL
Refills: 0 | Status: DISCONTINUED | OUTPATIENT
Start: 2024-06-08 | End: 2024-06-10

## 2024-06-08 RX ADMIN — LIDOCAINE HCL 2 PATCH: 28 GEL TOPICAL at 07:15

## 2024-06-08 RX ADMIN — Medication 15 MILLIGRAM(S): at 23:55

## 2024-06-08 RX ADMIN — PREGABALIN 100 MILLIGRAM(S): 50 CAPSULE ORAL at 06:07

## 2024-06-08 RX ADMIN — Medication 15 MILLIGRAM(S): at 06:08

## 2024-06-08 RX ADMIN — PREGABALIN 100 MILLIGRAM(S): 50 CAPSULE ORAL at 21:00

## 2024-06-08 RX ADMIN — Medication 2 TABLET(S): at 20:59

## 2024-06-08 RX ADMIN — Medication 1 TABLET(S): at 11:47

## 2024-06-08 RX ADMIN — LIDOCAINE HCL 2 PATCH: 28 GEL TOPICAL at 10:15

## 2024-06-08 RX ADMIN — Medication 100 MILLIGRAM(S): at 17:03

## 2024-06-08 RX ADMIN — Medication 600 MILLIGRAM(S): at 11:47

## 2024-06-08 RX ADMIN — ERGOCALCIFEROL 50000 UNIT(S): 1.25 CAPSULE ORAL at 11:47

## 2024-06-08 RX ADMIN — OXYCODONE HYDROCHLORIDE 5 MILLIGRAM(S): 100 SOLUTION ORAL at 09:30

## 2024-06-08 RX ADMIN — Medication 600 MILLIGRAM(S): at 21:00

## 2024-06-08 RX ADMIN — Medication 180 MILLIGRAM(S): at 11:48

## 2024-06-08 RX ADMIN — FLUTICASONE PROPIONATE 2 SPRAY(S): 50 SPRAY, METERED NASAL at 06:13

## 2024-06-08 RX ADMIN — PREGABALIN 100 MILLIGRAM(S): 50 CAPSULE ORAL at 14:18

## 2024-06-08 RX ADMIN — Medication 600 MILLIGRAM(S): at 06:07

## 2024-06-08 RX ADMIN — Medication 220 MILLIGRAM(S): at 11:47

## 2024-06-08 RX ADMIN — MONTELUKAST SODIUM 10 MILLIGRAM(S): 10 TABLET, FILM COATED ORAL at 21:00

## 2024-06-08 RX ADMIN — LIDOCAINE HCL 2 PATCH: 28 GEL TOPICAL at 21:02

## 2024-06-08 RX ADMIN — OXYCODONE HYDROCHLORIDE 5 MILLIGRAM(S): 100 SOLUTION ORAL at 08:59

## 2024-06-08 RX ADMIN — Medication 15 MILLIGRAM(S): at 17:03

## 2024-06-08 RX ADMIN — Medication 100 MILLIGRAM(S): at 06:08

## 2024-06-09 PROCEDURE — 99232 SBSQ HOSP IP/OBS MODERATE 35: CPT

## 2024-06-09 RX ORDER — TIOTROPIUM BROMIDE 18 UG/1
2 CAPSULE ORAL; RESPIRATORY (INHALATION) DAILY
Refills: 0 | Status: DISCONTINUED | OUTPATIENT
Start: 2024-06-09 | End: 2024-06-21

## 2024-06-09 RX ORDER — BUDESONIDE/FORMOTEROL FUMARATE 160-4.5MCG
2 HFA AEROSOL WITH ADAPTER (GRAM) INHALATION
Refills: 0 | Status: DISCONTINUED | OUTPATIENT
Start: 2024-06-09 | End: 2024-06-21

## 2024-06-09 RX ADMIN — Medication 100 MILLIGRAM(S): at 05:55

## 2024-06-09 RX ADMIN — LIDOCAINE HCL 2 PATCH: 28 GEL TOPICAL at 07:39

## 2024-06-09 RX ADMIN — PREGABALIN 100 MILLIGRAM(S): 50 CAPSULE ORAL at 22:19

## 2024-06-09 RX ADMIN — Medication 600 MILLIGRAM(S): at 20:12

## 2024-06-09 RX ADMIN — Medication 600 MILLIGRAM(S): at 12:28

## 2024-06-09 RX ADMIN — MONTELUKAST SODIUM 10 MILLIGRAM(S): 10 TABLET, FILM COATED ORAL at 22:18

## 2024-06-09 RX ADMIN — Medication 1 TABLET(S): at 11:28

## 2024-06-09 RX ADMIN — OXYCODONE HYDROCHLORIDE 5 MILLIGRAM(S): 100 SOLUTION ORAL at 11:28

## 2024-06-09 RX ADMIN — FLUTICASONE PROPIONATE 2 SPRAY(S): 50 SPRAY, METERED NASAL at 05:55

## 2024-06-09 RX ADMIN — Medication 220 MILLIGRAM(S): at 11:28

## 2024-06-09 RX ADMIN — LIDOCAINE HCL 2 PATCH: 28 GEL TOPICAL at 09:39

## 2024-06-09 RX ADMIN — TIOTROPIUM BROMIDE 2 PUFF(S): 18 CAPSULE ORAL; RESPIRATORY (INHALATION) at 10:37

## 2024-06-09 RX ADMIN — Medication 180 MILLIGRAM(S): at 11:28

## 2024-06-09 RX ADMIN — Medication 15 MILLIGRAM(S): at 23:30

## 2024-06-09 RX ADMIN — Medication 2 PUFF(S): at 10:37

## 2024-06-09 RX ADMIN — Medication 100 MILLIGRAM(S): at 17:17

## 2024-06-09 RX ADMIN — Medication 2 TABLET(S): at 22:19

## 2024-06-09 RX ADMIN — LIDOCAINE HCL 2 PATCH: 28 GEL TOPICAL at 22:19

## 2024-06-09 RX ADMIN — PREGABALIN 100 MILLIGRAM(S): 50 CAPSULE ORAL at 14:13

## 2024-06-09 RX ADMIN — Medication 15 MILLIGRAM(S): at 17:17

## 2024-06-09 RX ADMIN — Medication 15 MILLIGRAM(S): at 09:17

## 2024-06-09 RX ADMIN — OXYCODONE HYDROCHLORIDE 5 MILLIGRAM(S): 100 SOLUTION ORAL at 12:15

## 2024-06-09 RX ADMIN — PREGABALIN 100 MILLIGRAM(S): 50 CAPSULE ORAL at 05:55

## 2024-06-10 LAB
ANION GAP SERPL CALC-SCNC: 11 MMOL/L — SIGNIFICANT CHANGE UP (ref 5–17)
BUN SERPL-MCNC: 16 MG/DL — SIGNIFICANT CHANGE UP (ref 7–23)
CALCIUM SERPL-MCNC: 9.2 MG/DL — SIGNIFICANT CHANGE UP (ref 8.4–10.5)
CHLORIDE SERPL-SCNC: 102 MMOL/L — SIGNIFICANT CHANGE UP (ref 96–108)
CO2 SERPL-SCNC: 27 MMOL/L — SIGNIFICANT CHANGE UP (ref 22–31)
CREAT SERPL-MCNC: 0.69 MG/DL — SIGNIFICANT CHANGE UP (ref 0.5–1.3)
EGFR: 110 ML/MIN/1.73M2 — SIGNIFICANT CHANGE UP
GLUCOSE SERPL-MCNC: 105 MG/DL — HIGH (ref 70–99)
HCT VFR BLD CALC: 33.5 % — LOW (ref 39–50)
HGB BLD-MCNC: 11.1 G/DL — LOW (ref 13–17)
MCHC RBC-ENTMCNC: 30.3 PG — SIGNIFICANT CHANGE UP (ref 27–34)
MCHC RBC-ENTMCNC: 33.1 GM/DL — SIGNIFICANT CHANGE UP (ref 32–36)
MCV RBC AUTO: 91.5 FL — SIGNIFICANT CHANGE UP (ref 80–100)
NRBC # BLD: 0 /100 WBCS — SIGNIFICANT CHANGE UP (ref 0–0)
PLATELET # BLD AUTO: 266 K/UL — SIGNIFICANT CHANGE UP (ref 150–400)
POTASSIUM SERPL-MCNC: 3.6 MMOL/L — SIGNIFICANT CHANGE UP (ref 3.5–5.3)
POTASSIUM SERPL-SCNC: 3.6 MMOL/L — SIGNIFICANT CHANGE UP (ref 3.5–5.3)
RBC # BLD: 3.66 M/UL — LOW (ref 4.2–5.8)
RBC # FLD: 15.3 % — HIGH (ref 10.3–14.5)
SODIUM SERPL-SCNC: 140 MMOL/L — SIGNIFICANT CHANGE UP (ref 135–145)
WBC # BLD: 7.19 K/UL — SIGNIFICANT CHANGE UP (ref 3.8–10.5)
WBC # FLD AUTO: 7.19 K/UL — SIGNIFICANT CHANGE UP (ref 3.8–10.5)

## 2024-06-10 PROCEDURE — 99232 SBSQ HOSP IP/OBS MODERATE 35: CPT

## 2024-06-10 RX ORDER — OXYCODONE HYDROCHLORIDE 100 MG/5ML
5 SOLUTION ORAL EVERY 8 HOURS
Refills: 0 | Status: DISCONTINUED | OUTPATIENT
Start: 2024-06-10 | End: 2024-06-17

## 2024-06-10 RX ORDER — PREGABALIN 50 MG/1
100 CAPSULE ORAL
Refills: 0 | Status: DISCONTINUED | OUTPATIENT
Start: 2024-06-10 | End: 2024-06-17

## 2024-06-10 RX ORDER — BISACODYL 5 MG
10 TABLET, DELAYED RELEASE (ENTERIC COATED) ORAL DAILY
Refills: 0 | Status: DISCONTINUED | OUTPATIENT
Start: 2024-06-10 | End: 2024-06-21

## 2024-06-10 RX ADMIN — Medication 15 MILLIGRAM(S): at 18:49

## 2024-06-10 RX ADMIN — Medication 1 TABLET(S): at 11:13

## 2024-06-10 RX ADMIN — Medication 180 MILLIGRAM(S): at 11:15

## 2024-06-10 RX ADMIN — Medication 600 MILLIGRAM(S): at 21:00

## 2024-06-10 RX ADMIN — PREGABALIN 100 MILLIGRAM(S): 50 CAPSULE ORAL at 13:08

## 2024-06-10 RX ADMIN — Medication 2 TABLET(S): at 22:37

## 2024-06-10 RX ADMIN — MONTELUKAST SODIUM 10 MILLIGRAM(S): 10 TABLET, FILM COATED ORAL at 22:37

## 2024-06-10 RX ADMIN — LIDOCAINE HCL 2 PATCH: 28 GEL TOPICAL at 10:58

## 2024-06-10 RX ADMIN — FLUTICASONE PROPIONATE 2 SPRAY(S): 50 SPRAY, METERED NASAL at 06:09

## 2024-06-10 RX ADMIN — LIDOCAINE HCL 2 PATCH: 28 GEL TOPICAL at 22:36

## 2024-06-10 RX ADMIN — OXYCODONE HYDROCHLORIDE 5 MILLIGRAM(S): 100 SOLUTION ORAL at 08:27

## 2024-06-10 RX ADMIN — PREGABALIN 100 MILLIGRAM(S): 50 CAPSULE ORAL at 06:09

## 2024-06-10 RX ADMIN — Medication 15 MILLIGRAM(S): at 09:35

## 2024-06-10 RX ADMIN — Medication 220 MILLIGRAM(S): at 11:13

## 2024-06-10 RX ADMIN — TIOTROPIUM BROMIDE 2 PUFF(S): 18 CAPSULE ORAL; RESPIRATORY (INHALATION) at 08:20

## 2024-06-10 RX ADMIN — LIDOCAINE HCL 2 PATCH: 28 GEL TOPICAL at 07:56

## 2024-06-10 RX ADMIN — Medication 10 MILLIGRAM(S): at 20:07

## 2024-06-10 RX ADMIN — Medication 600 MILLIGRAM(S): at 11:14

## 2024-06-10 RX ADMIN — PREGABALIN 100 MILLIGRAM(S): 50 CAPSULE ORAL at 22:37

## 2024-06-10 RX ADMIN — Medication 2 PUFF(S): at 19:19

## 2024-06-10 RX ADMIN — Medication 2 PUFF(S): at 08:19

## 2024-06-10 RX ADMIN — OXYCODONE HYDROCHLORIDE 5 MILLIGRAM(S): 100 SOLUTION ORAL at 09:20

## 2024-06-10 RX ADMIN — Medication 100 MILLIGRAM(S): at 06:09

## 2024-06-10 RX ADMIN — Medication 100 MILLIGRAM(S): at 18:50

## 2024-06-11 LAB — CRP SERPL-MCNC: 45 MG/L — HIGH

## 2024-06-11 PROCEDURE — 99232 SBSQ HOSP IP/OBS MODERATE 35: CPT

## 2024-06-11 PROCEDURE — 99232 SBSQ HOSP IP/OBS MODERATE 35: CPT | Mod: GC

## 2024-06-11 RX ADMIN — Medication 15 MILLIGRAM(S): at 10:34

## 2024-06-11 RX ADMIN — LIDOCAINE HCL 2 PATCH: 28 GEL TOPICAL at 07:54

## 2024-06-11 RX ADMIN — Medication 15 MILLIGRAM(S): at 00:48

## 2024-06-11 RX ADMIN — Medication 15 MILLIGRAM(S): at 17:38

## 2024-06-11 RX ADMIN — LIDOCAINE HCL 2 PATCH: 28 GEL TOPICAL at 22:14

## 2024-06-11 RX ADMIN — OXYCODONE HYDROCHLORIDE 5 MILLIGRAM(S): 100 SOLUTION ORAL at 06:19

## 2024-06-11 RX ADMIN — Medication 2 TABLET(S): at 22:11

## 2024-06-11 RX ADMIN — TIOTROPIUM BROMIDE 2 PUFF(S): 18 CAPSULE ORAL; RESPIRATORY (INHALATION) at 07:58

## 2024-06-11 RX ADMIN — Medication 100 MILLIGRAM(S): at 05:39

## 2024-06-11 RX ADMIN — LIDOCAINE HCL 2 PATCH: 28 GEL TOPICAL at 10:30

## 2024-06-11 RX ADMIN — Medication 100 MILLIGRAM(S): at 17:36

## 2024-06-11 RX ADMIN — PREGABALIN 100 MILLIGRAM(S): 50 CAPSULE ORAL at 14:39

## 2024-06-11 RX ADMIN — PREGABALIN 100 MILLIGRAM(S): 50 CAPSULE ORAL at 05:40

## 2024-06-11 RX ADMIN — Medication 180 MILLIGRAM(S): at 13:00

## 2024-06-11 RX ADMIN — Medication 1 TABLET(S): at 13:00

## 2024-06-11 RX ADMIN — Medication 2 PUFF(S): at 21:47

## 2024-06-11 RX ADMIN — Medication 600 MILLIGRAM(S): at 13:00

## 2024-06-11 RX ADMIN — FLUTICASONE PROPIONATE 2 SPRAY(S): 50 SPRAY, METERED NASAL at 06:23

## 2024-06-11 RX ADMIN — Medication 15 MILLIGRAM(S): at 23:32

## 2024-06-11 RX ADMIN — Medication 220 MILLIGRAM(S): at 13:00

## 2024-06-11 RX ADMIN — MONTELUKAST SODIUM 10 MILLIGRAM(S): 10 TABLET, FILM COATED ORAL at 22:11

## 2024-06-11 RX ADMIN — PREGABALIN 100 MILLIGRAM(S): 50 CAPSULE ORAL at 22:11

## 2024-06-11 RX ADMIN — Medication 2 PUFF(S): at 07:58

## 2024-06-11 RX ADMIN — Medication 600 MILLIGRAM(S): at 20:17

## 2024-06-12 PROCEDURE — 99232 SBSQ HOSP IP/OBS MODERATE 35: CPT

## 2024-06-12 RX ADMIN — Medication 100 MILLIGRAM(S): at 18:15

## 2024-06-12 RX ADMIN — Medication 2 TABLET(S): at 21:38

## 2024-06-12 RX ADMIN — Medication 600 MILLIGRAM(S): at 21:37

## 2024-06-12 RX ADMIN — LIDOCAINE HCL 2 PATCH: 28 GEL TOPICAL at 10:14

## 2024-06-12 RX ADMIN — PREGABALIN 100 MILLIGRAM(S): 50 CAPSULE ORAL at 21:37

## 2024-06-12 RX ADMIN — Medication 100 MILLIGRAM(S): at 05:50

## 2024-06-12 RX ADMIN — OXYCODONE HYDROCHLORIDE 5 MILLIGRAM(S): 100 SOLUTION ORAL at 09:30

## 2024-06-12 RX ADMIN — Medication 15 MILLIGRAM(S): at 16:00

## 2024-06-12 RX ADMIN — OXYCODONE HYDROCHLORIDE 5 MILLIGRAM(S): 100 SOLUTION ORAL at 08:50

## 2024-06-12 RX ADMIN — OXYCODONE HYDROCHLORIDE 5 MILLIGRAM(S): 100 SOLUTION ORAL at 19:00

## 2024-06-12 RX ADMIN — MONTELUKAST SODIUM 10 MILLIGRAM(S): 10 TABLET, FILM COATED ORAL at 21:37

## 2024-06-12 RX ADMIN — OXYCODONE HYDROCHLORIDE 5 MILLIGRAM(S): 100 SOLUTION ORAL at 18:15

## 2024-06-12 RX ADMIN — Medication 2 PUFF(S): at 20:47

## 2024-06-12 RX ADMIN — LIDOCAINE HCL 2 PATCH: 28 GEL TOPICAL at 07:10

## 2024-06-12 RX ADMIN — Medication 15 MILLIGRAM(S): at 23:54

## 2024-06-13 LAB
ANION GAP SERPL CALC-SCNC: 12 MMOL/L — SIGNIFICANT CHANGE UP (ref 5–17)
BUN SERPL-MCNC: 16 MG/DL — SIGNIFICANT CHANGE UP (ref 7–23)
CALCIUM SERPL-MCNC: 9.4 MG/DL — SIGNIFICANT CHANGE UP (ref 8.4–10.5)
CHLORIDE SERPL-SCNC: 103 MMOL/L — SIGNIFICANT CHANGE UP (ref 96–108)
CO2 SERPL-SCNC: 26 MMOL/L — SIGNIFICANT CHANGE UP (ref 22–31)
CREAT SERPL-MCNC: 0.76 MG/DL — SIGNIFICANT CHANGE UP (ref 0.5–1.3)
EGFR: 107 ML/MIN/1.73M2 — SIGNIFICANT CHANGE UP
GLUCOSE SERPL-MCNC: 98 MG/DL — SIGNIFICANT CHANGE UP (ref 70–99)
HCT VFR BLD CALC: 32 % — LOW (ref 39–50)
HGB BLD-MCNC: 10.6 G/DL — LOW (ref 13–17)
MCHC RBC-ENTMCNC: 30.5 PG — SIGNIFICANT CHANGE UP (ref 27–34)
MCHC RBC-ENTMCNC: 33.1 GM/DL — SIGNIFICANT CHANGE UP (ref 32–36)
MCV RBC AUTO: 92.2 FL — SIGNIFICANT CHANGE UP (ref 80–100)
NRBC # BLD: 0 /100 WBCS — SIGNIFICANT CHANGE UP (ref 0–0)
PLATELET # BLD AUTO: 269 K/UL — SIGNIFICANT CHANGE UP (ref 150–400)
POTASSIUM SERPL-MCNC: 4 MMOL/L — SIGNIFICANT CHANGE UP (ref 3.5–5.3)
POTASSIUM SERPL-SCNC: 4 MMOL/L — SIGNIFICANT CHANGE UP (ref 3.5–5.3)
RBC # BLD: 3.47 M/UL — LOW (ref 4.2–5.8)
RBC # FLD: 15 % — HIGH (ref 10.3–14.5)
SODIUM SERPL-SCNC: 141 MMOL/L — SIGNIFICANT CHANGE UP (ref 135–145)
WBC # BLD: 6.5 K/UL — SIGNIFICANT CHANGE UP (ref 3.8–10.5)
WBC # FLD AUTO: 6.5 K/UL — SIGNIFICANT CHANGE UP (ref 3.8–10.5)

## 2024-06-13 PROCEDURE — 99232 SBSQ HOSP IP/OBS MODERATE 35: CPT

## 2024-06-13 RX ORDER — ENOXAPARIN SODIUM 100 MG/ML
40 INJECTION SUBCUTANEOUS EVERY 24 HOURS
Refills: 0 | Status: DISCONTINUED | OUTPATIENT
Start: 2024-06-13 | End: 2024-06-21

## 2024-06-13 RX ADMIN — Medication 180 MILLIGRAM(S): at 12:54

## 2024-06-13 RX ADMIN — PREGABALIN 100 MILLIGRAM(S): 50 CAPSULE ORAL at 06:40

## 2024-06-13 RX ADMIN — Medication 2 PUFF(S): at 21:04

## 2024-06-13 RX ADMIN — Medication 100 MILLIGRAM(S): at 06:42

## 2024-06-13 RX ADMIN — Medication 2 TABLET(S): at 22:32

## 2024-06-13 RX ADMIN — Medication 100 MILLIGRAM(S): at 18:13

## 2024-06-13 RX ADMIN — Medication 10 MILLIGRAM(S): at 19:40

## 2024-06-13 RX ADMIN — Medication 15 MILLIGRAM(S): at 18:10

## 2024-06-13 RX ADMIN — Medication 220 MILLIGRAM(S): at 12:54

## 2024-06-13 RX ADMIN — Medication 15 MILLIGRAM(S): at 10:07

## 2024-06-13 RX ADMIN — OXYCODONE HYDROCHLORIDE 5 MILLIGRAM(S): 100 SOLUTION ORAL at 07:50

## 2024-06-13 RX ADMIN — Medication 600 MILLIGRAM(S): at 12:53

## 2024-06-13 RX ADMIN — ENOXAPARIN SODIUM 40 MILLIGRAM(S): 100 INJECTION SUBCUTANEOUS at 18:12

## 2024-06-13 RX ADMIN — Medication 600 MILLIGRAM(S): at 22:32

## 2024-06-13 RX ADMIN — Medication 1 TABLET(S): at 12:54

## 2024-06-13 RX ADMIN — PREGABALIN 100 MILLIGRAM(S): 50 CAPSULE ORAL at 13:03

## 2024-06-13 RX ADMIN — MONTELUKAST SODIUM 10 MILLIGRAM(S): 10 TABLET, FILM COATED ORAL at 22:33

## 2024-06-13 RX ADMIN — OXYCODONE HYDROCHLORIDE 5 MILLIGRAM(S): 100 SOLUTION ORAL at 08:50

## 2024-06-14 PROCEDURE — 99232 SBSQ HOSP IP/OBS MODERATE 35: CPT

## 2024-06-14 PROCEDURE — 90791 PSYCH DIAGNOSTIC EVALUATION: CPT

## 2024-06-14 RX ORDER — OXYCODONE HYDROCHLORIDE 100 MG/5ML
2.5 SOLUTION ORAL ONCE
Refills: 0 | Status: DISCONTINUED | OUTPATIENT
Start: 2024-06-14 | End: 2024-06-14

## 2024-06-14 RX ORDER — OXYCODONE HYDROCHLORIDE 100 MG/5ML
2.5 SOLUTION ORAL EVERY 8 HOURS
Refills: 0 | Status: DISCONTINUED | OUTPATIENT
Start: 2024-06-14 | End: 2024-06-14

## 2024-06-14 RX ADMIN — OXYCODONE HYDROCHLORIDE 2.5 MILLIGRAM(S): 100 SOLUTION ORAL at 15:15

## 2024-06-14 RX ADMIN — Medication 15 MILLIGRAM(S): at 08:13

## 2024-06-14 RX ADMIN — Medication 15 MILLIGRAM(S): at 18:05

## 2024-06-14 RX ADMIN — Medication 100 MILLIGRAM(S): at 18:04

## 2024-06-14 RX ADMIN — Medication 2 PUFF(S): at 08:22

## 2024-06-14 RX ADMIN — Medication 2 TABLET(S): at 21:42

## 2024-06-14 RX ADMIN — Medication 220 MILLIGRAM(S): at 12:42

## 2024-06-14 RX ADMIN — OXYCODONE HYDROCHLORIDE 2.5 MILLIGRAM(S): 100 SOLUTION ORAL at 14:19

## 2024-06-14 RX ADMIN — ENOXAPARIN SODIUM 40 MILLIGRAM(S): 100 INJECTION SUBCUTANEOUS at 18:05

## 2024-06-14 RX ADMIN — MONTELUKAST SODIUM 10 MILLIGRAM(S): 10 TABLET, FILM COATED ORAL at 21:41

## 2024-06-14 RX ADMIN — Medication 2 PUFF(S): at 20:56

## 2024-06-14 RX ADMIN — Medication 15 MILLIGRAM(S): at 00:06

## 2024-06-14 RX ADMIN — PREGABALIN 100 MILLIGRAM(S): 50 CAPSULE ORAL at 23:37

## 2024-06-14 RX ADMIN — PREGABALIN 100 MILLIGRAM(S): 50 CAPSULE ORAL at 06:53

## 2024-06-14 RX ADMIN — Medication 100 MILLIGRAM(S): at 06:53

## 2024-06-14 RX ADMIN — Medication 1 TABLET(S): at 12:42

## 2024-06-14 RX ADMIN — OXYCODONE HYDROCHLORIDE 2.5 MILLIGRAM(S): 100 SOLUTION ORAL at 11:00

## 2024-06-14 RX ADMIN — Medication 15 MILLIGRAM(S): at 23:37

## 2024-06-14 RX ADMIN — OXYCODONE HYDROCHLORIDE 2.5 MILLIGRAM(S): 100 SOLUTION ORAL at 10:09

## 2024-06-14 RX ADMIN — PREGABALIN 100 MILLIGRAM(S): 50 CAPSULE ORAL at 14:21

## 2024-06-14 RX ADMIN — Medication 600 MILLIGRAM(S): at 21:36

## 2024-06-14 RX ADMIN — Medication 600 MILLIGRAM(S): at 12:41

## 2024-06-14 RX ADMIN — TIOTROPIUM BROMIDE 2 PUFF(S): 18 CAPSULE ORAL; RESPIRATORY (INHALATION) at 08:22

## 2024-06-15 PROCEDURE — 93970 EXTREMITY STUDY: CPT | Mod: 26

## 2024-06-15 PROCEDURE — 99232 SBSQ HOSP IP/OBS MODERATE 35: CPT

## 2024-06-15 RX ADMIN — Medication 100 MILLIGRAM(S): at 17:49

## 2024-06-15 RX ADMIN — Medication 15 MILLIGRAM(S): at 09:18

## 2024-06-15 RX ADMIN — Medication 220 MILLIGRAM(S): at 12:35

## 2024-06-15 RX ADMIN — Medication 1 TABLET(S): at 12:35

## 2024-06-15 RX ADMIN — PREGABALIN 100 MILLIGRAM(S): 50 CAPSULE ORAL at 14:28

## 2024-06-15 RX ADMIN — ENOXAPARIN SODIUM 40 MILLIGRAM(S): 100 INJECTION SUBCUTANEOUS at 17:49

## 2024-06-15 RX ADMIN — ERGOCALCIFEROL 50000 UNIT(S): 1.25 CAPSULE ORAL at 12:35

## 2024-06-15 RX ADMIN — Medication 180 MILLIGRAM(S): at 12:34

## 2024-06-15 RX ADMIN — Medication 100 MILLIGRAM(S): at 06:59

## 2024-06-15 RX ADMIN — Medication 2 PUFF(S): at 08:42

## 2024-06-15 RX ADMIN — Medication 15 MILLIGRAM(S): at 17:49

## 2024-06-15 RX ADMIN — PREGABALIN 100 MILLIGRAM(S): 50 CAPSULE ORAL at 07:00

## 2024-06-15 RX ADMIN — Medication 600 MILLIGRAM(S): at 12:35

## 2024-06-15 RX ADMIN — TIOTROPIUM BROMIDE 2 PUFF(S): 18 CAPSULE ORAL; RESPIRATORY (INHALATION) at 08:42

## 2024-06-16 PROCEDURE — 99232 SBSQ HOSP IP/OBS MODERATE 35: CPT

## 2024-06-16 RX ADMIN — Medication 600 MILLIGRAM(S): at 21:48

## 2024-06-16 RX ADMIN — Medication 975 MILLIGRAM(S): at 11:34

## 2024-06-16 RX ADMIN — FLUTICASONE PROPIONATE 2 SPRAY(S): 50 SPRAY, METERED NASAL at 06:05

## 2024-06-16 RX ADMIN — Medication 220 MILLIGRAM(S): at 11:39

## 2024-06-16 RX ADMIN — Medication 2 PUFF(S): at 21:02

## 2024-06-16 RX ADMIN — OXYCODONE HYDROCHLORIDE 5 MILLIGRAM(S): 100 SOLUTION ORAL at 14:06

## 2024-06-16 RX ADMIN — Medication 15 MILLIGRAM(S): at 00:01

## 2024-06-16 RX ADMIN — Medication 100 MILLIGRAM(S): at 17:34

## 2024-06-16 RX ADMIN — PREGABALIN 100 MILLIGRAM(S): 50 CAPSULE ORAL at 21:48

## 2024-06-16 RX ADMIN — Medication 15 MILLIGRAM(S): at 23:24

## 2024-06-16 RX ADMIN — Medication 1 TABLET(S): at 11:38

## 2024-06-16 RX ADMIN — Medication 600 MILLIGRAM(S): at 11:34

## 2024-06-16 RX ADMIN — Medication 100 MILLIGRAM(S): at 06:06

## 2024-06-16 RX ADMIN — PREGABALIN 100 MILLIGRAM(S): 50 CAPSULE ORAL at 16:17

## 2024-06-16 RX ADMIN — TIOTROPIUM BROMIDE 2 PUFF(S): 18 CAPSULE ORAL; RESPIRATORY (INHALATION) at 08:33

## 2024-06-16 RX ADMIN — OXYCODONE HYDROCHLORIDE 5 MILLIGRAM(S): 100 SOLUTION ORAL at 14:35

## 2024-06-16 RX ADMIN — Medication 15 MILLIGRAM(S): at 17:34

## 2024-06-16 RX ADMIN — Medication 180 MILLIGRAM(S): at 11:38

## 2024-06-16 RX ADMIN — LIDOCAINE HCL 2 PATCH: 28 GEL TOPICAL at 19:17

## 2024-06-16 RX ADMIN — Medication 2 PUFF(S): at 08:33

## 2024-06-16 RX ADMIN — Medication 15 MILLIGRAM(S): at 08:11

## 2024-06-16 RX ADMIN — PREGABALIN 100 MILLIGRAM(S): 50 CAPSULE ORAL at 06:06

## 2024-06-16 RX ADMIN — MONTELUKAST SODIUM 10 MILLIGRAM(S): 10 TABLET, FILM COATED ORAL at 21:49

## 2024-06-16 RX ADMIN — LIDOCAINE HCL 2 PATCH: 28 GEL TOPICAL at 21:48

## 2024-06-16 RX ADMIN — ENOXAPARIN SODIUM 40 MILLIGRAM(S): 100 INJECTION SUBCUTANEOUS at 17:34

## 2024-06-17 LAB
ANION GAP SERPL CALC-SCNC: 9 MMOL/L — SIGNIFICANT CHANGE UP (ref 5–17)
BUN SERPL-MCNC: 14 MG/DL — SIGNIFICANT CHANGE UP (ref 7–23)
CALCIUM SERPL-MCNC: 9.2 MG/DL — SIGNIFICANT CHANGE UP (ref 8.4–10.5)
CHLORIDE SERPL-SCNC: 103 MMOL/L — SIGNIFICANT CHANGE UP (ref 96–108)
CO2 SERPL-SCNC: 28 MMOL/L — SIGNIFICANT CHANGE UP (ref 22–31)
CREAT SERPL-MCNC: 0.77 MG/DL — SIGNIFICANT CHANGE UP (ref 0.5–1.3)
CRP SERPL-MCNC: 22 MG/L — HIGH
EGFR: 106 ML/MIN/1.73M2 — SIGNIFICANT CHANGE UP
ERYTHROCYTE [SEDIMENTATION RATE] IN BLOOD: 83 MM/HR — HIGH (ref 0–20)
GLUCOSE SERPL-MCNC: 103 MG/DL — HIGH (ref 70–99)
HCT VFR BLD CALC: 33.7 % — LOW (ref 39–50)
HGB BLD-MCNC: 11.3 G/DL — LOW (ref 13–17)
MCHC RBC-ENTMCNC: 30.5 PG — SIGNIFICANT CHANGE UP (ref 27–34)
MCHC RBC-ENTMCNC: 33.5 GM/DL — SIGNIFICANT CHANGE UP (ref 32–36)
MCV RBC AUTO: 91.1 FL — SIGNIFICANT CHANGE UP (ref 80–100)
NRBC # BLD: 0 /100 WBCS — SIGNIFICANT CHANGE UP (ref 0–0)
PLATELET # BLD AUTO: 284 K/UL — SIGNIFICANT CHANGE UP (ref 150–400)
POTASSIUM SERPL-MCNC: 4.1 MMOL/L — SIGNIFICANT CHANGE UP (ref 3.5–5.3)
POTASSIUM SERPL-SCNC: 4.1 MMOL/L — SIGNIFICANT CHANGE UP (ref 3.5–5.3)
RBC # BLD: 3.7 M/UL — LOW (ref 4.2–5.8)
RBC # FLD: 14.5 % — SIGNIFICANT CHANGE UP (ref 10.3–14.5)
SODIUM SERPL-SCNC: 140 MMOL/L — SIGNIFICANT CHANGE UP (ref 135–145)
WBC # BLD: 6.71 K/UL — SIGNIFICANT CHANGE UP (ref 3.8–10.5)
WBC # FLD AUTO: 6.71 K/UL — SIGNIFICANT CHANGE UP (ref 3.8–10.5)

## 2024-06-17 PROCEDURE — 99232 SBSQ HOSP IP/OBS MODERATE 35: CPT

## 2024-06-17 RX ADMIN — Medication 15 MILLIGRAM(S): at 08:35

## 2024-06-17 RX ADMIN — MONTELUKAST SODIUM 10 MILLIGRAM(S): 10 TABLET, FILM COATED ORAL at 21:19

## 2024-06-17 RX ADMIN — ENOXAPARIN SODIUM 40 MILLIGRAM(S): 100 INJECTION SUBCUTANEOUS at 17:27

## 2024-06-17 RX ADMIN — LIDOCAINE HCL 2 PATCH: 28 GEL TOPICAL at 21:20

## 2024-06-17 RX ADMIN — PREGABALIN 100 MILLIGRAM(S): 50 CAPSULE ORAL at 14:10

## 2024-06-17 RX ADMIN — Medication 2 TABLET(S): at 21:19

## 2024-06-17 RX ADMIN — PREGABALIN 100 MILLIGRAM(S): 50 CAPSULE ORAL at 21:19

## 2024-06-17 RX ADMIN — OXYCODONE HYDROCHLORIDE 5 MILLIGRAM(S): 100 SOLUTION ORAL at 10:05

## 2024-06-17 RX ADMIN — TIOTROPIUM BROMIDE 2 PUFF(S): 18 CAPSULE ORAL; RESPIRATORY (INHALATION) at 08:09

## 2024-06-17 RX ADMIN — Medication 600 MILLIGRAM(S): at 21:19

## 2024-06-17 RX ADMIN — Medication 100 MILLIGRAM(S): at 17:27

## 2024-06-17 RX ADMIN — PREGABALIN 100 MILLIGRAM(S): 50 CAPSULE ORAL at 06:17

## 2024-06-17 RX ADMIN — Medication 220 MILLIGRAM(S): at 13:05

## 2024-06-17 RX ADMIN — Medication 2 PUFF(S): at 08:10

## 2024-06-17 RX ADMIN — OXYCODONE HYDROCHLORIDE 5 MILLIGRAM(S): 100 SOLUTION ORAL at 11:00

## 2024-06-17 RX ADMIN — Medication 180 MILLIGRAM(S): at 13:04

## 2024-06-17 RX ADMIN — Medication 600 MILLIGRAM(S): at 13:04

## 2024-06-17 RX ADMIN — Medication 15 MILLIGRAM(S): at 17:27

## 2024-06-17 RX ADMIN — Medication 100 MILLIGRAM(S): at 06:17

## 2024-06-17 RX ADMIN — Medication 1 TABLET(S): at 13:05

## 2024-06-17 RX ADMIN — Medication 15 MILLIGRAM(S): at 21:22

## 2024-06-17 RX ADMIN — FLUTICASONE PROPIONATE 2 SPRAY(S): 50 SPRAY, METERED NASAL at 06:16

## 2024-06-17 RX ADMIN — Medication 975 MILLIGRAM(S): at 23:43

## 2024-06-17 RX ADMIN — Medication 2 PUFF(S): at 20:48

## 2024-06-18 PROCEDURE — 90792 PSYCH DIAG EVAL W/MED SRVCS: CPT

## 2024-06-18 PROCEDURE — 99232 SBSQ HOSP IP/OBS MODERATE 35: CPT

## 2024-06-18 RX ORDER — ALPRAZOLAM 2 MG/1
0.25 TABLET ORAL ONCE
Refills: 0 | Status: DISCONTINUED | OUTPATIENT
Start: 2024-06-18 | End: 2024-06-18

## 2024-06-18 RX ORDER — OXYCODONE HYDROCHLORIDE 100 MG/5ML
5 SOLUTION ORAL EVERY 8 HOURS
Refills: 0 | Status: DISCONTINUED | OUTPATIENT
Start: 2024-06-18 | End: 2024-06-21

## 2024-06-18 RX ORDER — PREGABALIN 50 MG/1
100 CAPSULE ORAL
Refills: 0 | Status: DISCONTINUED | OUTPATIENT
Start: 2024-06-18 | End: 2024-06-21

## 2024-06-18 RX ADMIN — OXYCODONE HYDROCHLORIDE 5 MILLIGRAM(S): 100 SOLUTION ORAL at 09:00

## 2024-06-18 RX ADMIN — ALPRAZOLAM 0.25 MILLIGRAM(S): 2 TABLET ORAL at 23:37

## 2024-06-18 RX ADMIN — Medication 15 MILLIGRAM(S): at 23:38

## 2024-06-18 RX ADMIN — MONTELUKAST SODIUM 10 MILLIGRAM(S): 10 TABLET, FILM COATED ORAL at 21:19

## 2024-06-18 RX ADMIN — Medication 2 PUFF(S): at 21:13

## 2024-06-18 RX ADMIN — Medication 100 MILLIGRAM(S): at 18:34

## 2024-06-18 RX ADMIN — Medication 15 MILLIGRAM(S): at 18:34

## 2024-06-18 RX ADMIN — LIDOCAINE HCL 2 PATCH: 28 GEL TOPICAL at 21:19

## 2024-06-18 RX ADMIN — LIDOCAINE HCL 2 PATCH: 28 GEL TOPICAL at 09:59

## 2024-06-18 RX ADMIN — Medication 1 TABLET(S): at 11:51

## 2024-06-18 RX ADMIN — Medication 15 MILLIGRAM(S): at 10:53

## 2024-06-18 RX ADMIN — Medication 100 MILLIGRAM(S): at 05:55

## 2024-06-18 RX ADMIN — Medication 180 MILLIGRAM(S): at 11:52

## 2024-06-18 RX ADMIN — TIOTROPIUM BROMIDE 2 PUFF(S): 18 CAPSULE ORAL; RESPIRATORY (INHALATION) at 08:01

## 2024-06-18 RX ADMIN — Medication 975 MILLIGRAM(S): at 00:30

## 2024-06-18 RX ADMIN — PREGABALIN 100 MILLIGRAM(S): 50 CAPSULE ORAL at 13:51

## 2024-06-18 RX ADMIN — OXYCODONE HYDROCHLORIDE 5 MILLIGRAM(S): 100 SOLUTION ORAL at 08:02

## 2024-06-18 RX ADMIN — Medication 600 MILLIGRAM(S): at 11:52

## 2024-06-18 RX ADMIN — Medication 600 MILLIGRAM(S): at 21:19

## 2024-06-18 RX ADMIN — LIDOCAINE HCL 2 PATCH: 28 GEL TOPICAL at 07:59

## 2024-06-18 RX ADMIN — ENOXAPARIN SODIUM 40 MILLIGRAM(S): 100 INJECTION SUBCUTANEOUS at 18:34

## 2024-06-18 RX ADMIN — Medication 2 PUFF(S): at 08:03

## 2024-06-18 RX ADMIN — PREGABALIN 100 MILLIGRAM(S): 50 CAPSULE ORAL at 21:19

## 2024-06-18 RX ADMIN — Medication 220 MILLIGRAM(S): at 11:51

## 2024-06-19 ENCOUNTER — TRANSCRIPTION ENCOUNTER (OUTPATIENT)
Age: 54
End: 2024-06-19

## 2024-06-19 DIAGNOSIS — J45.909 UNSPECIFIED ASTHMA, UNCOMPLICATED: ICD-10-CM

## 2024-06-19 DIAGNOSIS — M46.26 OSTEOMYELITIS OF VERTEBRA, LUMBAR REGION: ICD-10-CM

## 2024-06-19 PROCEDURE — 99232 SBSQ HOSP IP/OBS MODERATE 35: CPT

## 2024-06-19 RX ORDER — NALOXONE HYDROCHLORIDE 1 MG/ML
1 INJECTION PARENTERAL
Qty: 2 | Refills: 1
Start: 2024-06-19 | End: 2024-06-26

## 2024-06-19 RX ORDER — BISACODYL 5 MG
1 TABLET, DELAYED RELEASE (ENTERIC COATED) ORAL
Qty: 0 | Refills: 0 | DISCHARGE
Start: 2024-06-19

## 2024-06-19 RX ORDER — ERGOCALCIFEROL 1.25 MG/1
1 CAPSULE ORAL
Qty: 4 | Refills: 0
Start: 2024-06-19 | End: 2024-07-18

## 2024-06-19 RX ORDER — GABAPENTIN
2 POWDER (GRAM) MISCELLANEOUS
Qty: 120 | Refills: 0
Start: 2024-06-19

## 2024-06-19 RX ORDER — ACETAMINOPHEN 325 MG
3 TABLET ORAL
Qty: 0 | Refills: 0 | DISCHARGE
Start: 2024-06-19

## 2024-06-19 RX ORDER — SENNOSIDES 8.6 MG
2 TABLET ORAL
Qty: 0 | Refills: 0 | DISCHARGE
Start: 2024-06-19

## 2024-06-19 RX ORDER — ALPRAZOLAM 2 MG/1
0.25 TABLET ORAL AT BEDTIME
Refills: 0 | Status: DISCONTINUED | OUTPATIENT
Start: 2024-06-19 | End: 2024-06-21

## 2024-06-19 RX ORDER — TIOTROPIUM BROMIDE 18 UG/1
2 CAPSULE ORAL; RESPIRATORY (INHALATION)
Qty: 2 | Refills: 0
Start: 2024-06-19 | End: 2024-07-18

## 2024-06-19 RX ORDER — LIDOCAINE HCL 28 MG/G
2 GEL TOPICAL
Qty: 0 | Refills: 0 | DISCHARGE
Start: 2024-06-19

## 2024-06-19 RX ORDER — POLYETHYLENE GLYCOL 3350 1 G/G
17 POWDER ORAL
Qty: 0 | Refills: 0 | DISCHARGE
Start: 2024-06-19

## 2024-06-19 RX ORDER — TIOTROPIUM BROMIDE 18 UG/1
2 CAPSULE ORAL; RESPIRATORY (INHALATION)
Qty: 2 | Refills: 0
Start: 2024-06-19

## 2024-06-19 RX ORDER — E-LYTES/CARBOXYMETHYLCELLULOSE
15 SOLUTION, NON-ORAL MUCOUS MEMBRANE
Qty: 0 | Refills: 0 | DISCHARGE
Start: 2024-06-19

## 2024-06-19 RX ORDER — ALPRAZOLAM 2 MG/1
1 TABLET ORAL
Qty: 7 | Refills: 0
Start: 2024-06-19 | End: 2024-06-25

## 2024-06-19 RX ORDER — OXYCODONE HYDROCHLORIDE 100 MG/5ML
1 SOLUTION ORAL
Qty: 21 | Refills: 0
Start: 2024-06-19 | End: 2024-06-25

## 2024-06-19 RX ORDER — GABAPENTIN
2 POWDER (GRAM) MISCELLANEOUS
Qty: 120 | Refills: 0
Start: 2024-06-19 | End: 2024-07-18

## 2024-06-19 RX ORDER — FEXOFENADINE HCL 180 MG
1 TABLET ORAL
Qty: 0 | Refills: 0 | DISCHARGE
Start: 2024-06-19

## 2024-06-19 RX ORDER — MONTELUKAST SODIUM 10 MG/1
1 TABLET, FILM COATED ORAL
Qty: 30 | Refills: 0
Start: 2024-06-19 | End: 2024-07-18

## 2024-06-19 RX ORDER — MONTELUKAST SODIUM 10 MG/1
1 TABLET, FILM COATED ORAL
Qty: 30 | Refills: 0
Start: 2024-06-19

## 2024-06-19 RX ORDER — SIMETHICONE 40MG/0.6ML
1 SUSPENSION, DROPS(FINAL DOSAGE FORM)(ML) ORAL
Qty: 0 | Refills: 0 | DISCHARGE
Start: 2024-06-19

## 2024-06-19 RX ORDER — NALOXONE HYDROCHLORIDE 1 MG/ML
1 INJECTION PARENTERAL
Qty: 1 | Refills: 1
Start: 2024-06-19

## 2024-06-19 RX ORDER — BUDESONIDE/FORMOTEROL FUMARATE 160-4.5MCG
2 HFA AEROSOL WITH ADAPTER (GRAM) INHALATION
Qty: 2 | Refills: 0
Start: 2024-06-19

## 2024-06-19 RX ORDER — BUDESONIDE/FORMOTEROL FUMARATE 160-4.5MCG
2 HFA AEROSOL WITH ADAPTER (GRAM) INHALATION
Qty: 2 | Refills: 0
Start: 2024-06-19 | End: 2024-07-18

## 2024-06-19 RX ORDER — ZINC SULFATE 50(220)MG
1 CAPSULE ORAL
Qty: 0 | Refills: 0 | DISCHARGE
Start: 2024-06-19

## 2024-06-19 RX ORDER — PREGABALIN 50 MG/1
1 CAPSULE ORAL
Qty: 90 | Refills: 0
Start: 2024-06-19 | End: 2024-07-18

## 2024-06-19 RX ORDER — FLUTICASONE PROPIONATE 50 UG/1
2 SPRAY, METERED NASAL
Qty: 0 | Refills: 0 | DISCHARGE
Start: 2024-06-19

## 2024-06-19 RX ORDER — ERGOCALCIFEROL 1.25 MG/1
1 CAPSULE ORAL
Qty: 4 | Refills: 0
Start: 2024-06-19

## 2024-06-19 RX ORDER — LIDOCAINE HCL 28 MG/G
2 GEL TOPICAL
Qty: 6 | Refills: 0
Start: 2024-06-19 | End: 2024-07-18

## 2024-06-19 RX ADMIN — TIOTROPIUM BROMIDE 2 PUFF(S): 18 CAPSULE ORAL; RESPIRATORY (INHALATION) at 08:25

## 2024-06-19 RX ADMIN — PREGABALIN 100 MILLIGRAM(S): 50 CAPSULE ORAL at 15:49

## 2024-06-19 RX ADMIN — LIDOCAINE HCL 2 PATCH: 28 GEL TOPICAL at 21:22

## 2024-06-19 RX ADMIN — Medication 15 MILLIGRAM(S): at 09:11

## 2024-06-19 RX ADMIN — Medication 975 MILLIGRAM(S): at 18:38

## 2024-06-19 RX ADMIN — Medication 975 MILLIGRAM(S): at 17:40

## 2024-06-19 RX ADMIN — Medication 600 MILLIGRAM(S): at 12:00

## 2024-06-19 RX ADMIN — FLUTICASONE PROPIONATE 2 SPRAY(S): 50 SPRAY, METERED NASAL at 06:22

## 2024-06-19 RX ADMIN — Medication 600 MILLIGRAM(S): at 21:21

## 2024-06-19 RX ADMIN — ENOXAPARIN SODIUM 40 MILLIGRAM(S): 100 INJECTION SUBCUTANEOUS at 17:33

## 2024-06-19 RX ADMIN — Medication 100 MILLIGRAM(S): at 17:31

## 2024-06-19 RX ADMIN — MONTELUKAST SODIUM 10 MILLIGRAM(S): 10 TABLET, FILM COATED ORAL at 21:21

## 2024-06-19 RX ADMIN — Medication 100 MILLIGRAM(S): at 06:21

## 2024-06-19 RX ADMIN — ALPRAZOLAM 0.25 MILLIGRAM(S): 2 TABLET ORAL at 21:21

## 2024-06-19 RX ADMIN — PREGABALIN 100 MILLIGRAM(S): 50 CAPSULE ORAL at 21:21

## 2024-06-19 RX ADMIN — Medication 15 MILLIGRAM(S): at 23:13

## 2024-06-19 RX ADMIN — PREGABALIN 100 MILLIGRAM(S): 50 CAPSULE ORAL at 06:21

## 2024-06-19 RX ADMIN — Medication 15 MILLIGRAM(S): at 17:31

## 2024-06-19 RX ADMIN — Medication 1 TABLET(S): at 12:00

## 2024-06-19 RX ADMIN — Medication 220 MILLIGRAM(S): at 12:00

## 2024-06-19 RX ADMIN — Medication 2 PUFF(S): at 08:25

## 2024-06-19 RX ADMIN — Medication 180 MILLIGRAM(S): at 12:00

## 2024-06-20 ENCOUNTER — TRANSCRIPTION ENCOUNTER (OUTPATIENT)
Age: 54
End: 2024-06-20

## 2024-06-20 LAB
ANION GAP SERPL CALC-SCNC: 10 MMOL/L — SIGNIFICANT CHANGE UP (ref 5–17)
BUN SERPL-MCNC: 17 MG/DL — SIGNIFICANT CHANGE UP (ref 7–23)
CALCIUM SERPL-MCNC: 9.5 MG/DL — SIGNIFICANT CHANGE UP (ref 8.4–10.5)
CHLORIDE SERPL-SCNC: 104 MMOL/L — SIGNIFICANT CHANGE UP (ref 96–108)
CO2 SERPL-SCNC: 28 MMOL/L — SIGNIFICANT CHANGE UP (ref 22–31)
CREAT SERPL-MCNC: 0.8 MG/DL — SIGNIFICANT CHANGE UP (ref 0.5–1.3)
CRP SERPL-MCNC: 24 MG/L — HIGH
EGFR: 105 ML/MIN/1.73M2 — SIGNIFICANT CHANGE UP
ERYTHROCYTE [SEDIMENTATION RATE] IN BLOOD: 67 MM/HR — HIGH (ref 0–20)
GLUCOSE SERPL-MCNC: 102 MG/DL — HIGH (ref 70–99)
HCT VFR BLD CALC: 36 % — LOW (ref 39–50)
HGB BLD-MCNC: 11.8 G/DL — LOW (ref 13–17)
MCHC RBC-ENTMCNC: 30 PG — SIGNIFICANT CHANGE UP (ref 27–34)
MCHC RBC-ENTMCNC: 32.8 GM/DL — SIGNIFICANT CHANGE UP (ref 32–36)
MCV RBC AUTO: 91.6 FL — SIGNIFICANT CHANGE UP (ref 80–100)
NRBC # BLD: 0 /100 WBCS — SIGNIFICANT CHANGE UP (ref 0–0)
PLATELET # BLD AUTO: 269 K/UL — SIGNIFICANT CHANGE UP (ref 150–400)
POTASSIUM SERPL-MCNC: 4 MMOL/L — SIGNIFICANT CHANGE UP (ref 3.5–5.3)
POTASSIUM SERPL-SCNC: 4 MMOL/L — SIGNIFICANT CHANGE UP (ref 3.5–5.3)
RBC # BLD: 3.93 M/UL — LOW (ref 4.2–5.8)
RBC # FLD: 14.4 % — SIGNIFICANT CHANGE UP (ref 10.3–14.5)
SODIUM SERPL-SCNC: 142 MMOL/L — SIGNIFICANT CHANGE UP (ref 135–145)
WBC # BLD: 5.66 K/UL — SIGNIFICANT CHANGE UP (ref 3.8–10.5)
WBC # FLD AUTO: 5.66 K/UL — SIGNIFICANT CHANGE UP (ref 3.8–10.5)

## 2024-06-20 PROCEDURE — 99232 SBSQ HOSP IP/OBS MODERATE 35: CPT

## 2024-06-20 RX ORDER — CEFTRIAXONE SODIUM 500 MG
2 VIAL (EA) INJECTION
Qty: 10 | Refills: 0
Start: 2024-06-20 | End: 2024-06-24

## 2024-06-20 RX ADMIN — FLUTICASONE PROPIONATE 2 SPRAY(S): 50 SPRAY, METERED NASAL at 06:12

## 2024-06-20 RX ADMIN — TIOTROPIUM BROMIDE 2 PUFF(S): 18 CAPSULE ORAL; RESPIRATORY (INHALATION) at 08:12

## 2024-06-20 RX ADMIN — Medication 220 MILLIGRAM(S): at 11:46

## 2024-06-20 RX ADMIN — Medication 1 TABLET(S): at 11:46

## 2024-06-20 RX ADMIN — PREGABALIN 100 MILLIGRAM(S): 50 CAPSULE ORAL at 06:13

## 2024-06-20 RX ADMIN — Medication 180 MILLIGRAM(S): at 11:46

## 2024-06-20 RX ADMIN — Medication 2 TABLET(S): at 21:13

## 2024-06-20 RX ADMIN — PREGABALIN 100 MILLIGRAM(S): 50 CAPSULE ORAL at 21:13

## 2024-06-20 RX ADMIN — LIDOCAINE HCL 2 PATCH: 28 GEL TOPICAL at 09:20

## 2024-06-20 RX ADMIN — PREGABALIN 100 MILLIGRAM(S): 50 CAPSULE ORAL at 13:57

## 2024-06-20 RX ADMIN — LIDOCAINE HCL 2 PATCH: 28 GEL TOPICAL at 07:20

## 2024-06-20 RX ADMIN — Medication 975 MILLIGRAM(S): at 06:14

## 2024-06-20 RX ADMIN — Medication 100 MILLIGRAM(S): at 06:17

## 2024-06-20 RX ADMIN — Medication 15 MILLIGRAM(S): at 17:13

## 2024-06-20 RX ADMIN — MONTELUKAST SODIUM 10 MILLIGRAM(S): 10 TABLET, FILM COATED ORAL at 21:13

## 2024-06-20 RX ADMIN — Medication 2 PUFF(S): at 08:14

## 2024-06-20 RX ADMIN — Medication 100 MILLIGRAM(S): at 17:14

## 2024-06-20 RX ADMIN — Medication 600 MILLIGRAM(S): at 21:13

## 2024-06-20 RX ADMIN — Medication 600 MILLIGRAM(S): at 11:45

## 2024-06-20 RX ADMIN — Medication 15 MILLIGRAM(S): at 08:49

## 2024-06-20 RX ADMIN — LIDOCAINE HCL 2 PATCH: 28 GEL TOPICAL at 21:14

## 2024-06-20 RX ADMIN — ENOXAPARIN SODIUM 40 MILLIGRAM(S): 100 INJECTION SUBCUTANEOUS at 17:13

## 2024-06-21 VITALS
TEMPERATURE: 98 F | RESPIRATION RATE: 16 BRPM | OXYGEN SATURATION: 98 % | SYSTOLIC BLOOD PRESSURE: 100 MMHG | DIASTOLIC BLOOD PRESSURE: 76 MMHG | HEART RATE: 88 BPM

## 2024-06-21 PROCEDURE — 97161 PT EVAL LOW COMPLEX 20 MIN: CPT | Mod: GP

## 2024-06-21 PROCEDURE — 80048 BASIC METABOLIC PNL TOTAL CA: CPT

## 2024-06-21 PROCEDURE — 94664 DEMO&/EVAL PT USE INHALER: CPT

## 2024-06-21 PROCEDURE — 74176 CT ABD & PELVIS W/O CONTRAST: CPT | Mod: MC

## 2024-06-21 PROCEDURE — 80053 COMPREHEN METABOLIC PANEL: CPT

## 2024-06-21 PROCEDURE — 87635 SARS-COV-2 COVID-19 AMP PRB: CPT

## 2024-06-21 PROCEDURE — 72131 CT LUMBAR SPINE W/O DYE: CPT | Mod: MC

## 2024-06-21 PROCEDURE — 85027 COMPLETE CBC AUTOMATED: CPT

## 2024-06-21 PROCEDURE — 84100 ASSAY OF PHOSPHORUS: CPT

## 2024-06-21 PROCEDURE — 97165 OT EVAL LOW COMPLEX 30 MIN: CPT | Mod: GO

## 2024-06-21 PROCEDURE — 85610 PROTHROMBIN TIME: CPT

## 2024-06-21 PROCEDURE — 76870 US EXAM SCROTUM: CPT

## 2024-06-21 PROCEDURE — 97116 GAIT TRAINING THERAPY: CPT | Mod: GP

## 2024-06-21 PROCEDURE — 85730 THROMBOPLASTIN TIME PARTIAL: CPT

## 2024-06-21 PROCEDURE — 71045 X-RAY EXAM CHEST 1 VIEW: CPT

## 2024-06-21 PROCEDURE — 97530 THERAPEUTIC ACTIVITIES: CPT | Mod: GP

## 2024-06-21 PROCEDURE — 86140 C-REACTIVE PROTEIN: CPT

## 2024-06-21 PROCEDURE — 85652 RBC SED RATE AUTOMATED: CPT

## 2024-06-21 PROCEDURE — 86901 BLOOD TYPING SEROLOGIC RH(D): CPT

## 2024-06-21 PROCEDURE — A9585: CPT

## 2024-06-21 PROCEDURE — 86850 RBC ANTIBODY SCREEN: CPT

## 2024-06-21 PROCEDURE — 86900 BLOOD TYPING SEROLOGIC ABO: CPT

## 2024-06-21 PROCEDURE — 99232 SBSQ HOSP IP/OBS MODERATE 35: CPT

## 2024-06-21 PROCEDURE — 97110 THERAPEUTIC EXERCISES: CPT | Mod: GP

## 2024-06-21 PROCEDURE — 36415 COLL VENOUS BLD VENIPUNCTURE: CPT

## 2024-06-21 PROCEDURE — 81001 URINALYSIS AUTO W/SCOPE: CPT

## 2024-06-21 PROCEDURE — 72158 MRI LUMBAR SPINE W/O & W/DYE: CPT | Mod: MC

## 2024-06-21 PROCEDURE — 93970 EXTREMITY STUDY: CPT

## 2024-06-21 PROCEDURE — 97535 SELF CARE MNGMENT TRAINING: CPT | Mod: GO

## 2024-06-21 PROCEDURE — 94640 AIRWAY INHALATION TREATMENT: CPT

## 2024-06-21 PROCEDURE — 83735 ASSAY OF MAGNESIUM: CPT

## 2024-06-21 PROCEDURE — 85025 COMPLETE CBC W/AUTO DIFF WBC: CPT

## 2024-06-21 RX ORDER — BACLOFEN 20 MG
3 TABLET ORAL
Qty: 270 | Refills: 0
Start: 2024-06-21 | End: 2024-07-20

## 2024-06-21 RX ADMIN — Medication 180 MILLIGRAM(S): at 11:12

## 2024-06-21 RX ADMIN — Medication 600 MILLIGRAM(S): at 11:12

## 2024-06-21 RX ADMIN — Medication 220 MILLIGRAM(S): at 11:13

## 2024-06-21 RX ADMIN — Medication 100 MILLIGRAM(S): at 06:15

## 2024-06-21 RX ADMIN — TIOTROPIUM BROMIDE 2 PUFF(S): 18 CAPSULE ORAL; RESPIRATORY (INHALATION) at 08:09

## 2024-06-21 RX ADMIN — Medication 1 APPLICATION(S): at 11:16

## 2024-06-21 RX ADMIN — Medication 1 TABLET(S): at 11:13

## 2024-06-21 RX ADMIN — ALPRAZOLAM 0.25 MILLIGRAM(S): 2 TABLET ORAL at 00:05

## 2024-06-21 RX ADMIN — PREGABALIN 100 MILLIGRAM(S): 50 CAPSULE ORAL at 06:16

## 2024-06-21 RX ADMIN — LIDOCAINE HCL 2 PATCH: 28 GEL TOPICAL at 07:05

## 2024-06-21 RX ADMIN — Medication 15 MILLIGRAM(S): at 08:59

## 2024-06-21 RX ADMIN — Medication 15 MILLIGRAM(S): at 17:21

## 2024-06-21 RX ADMIN — Medication 100 MILLIGRAM(S): at 17:21

## 2024-06-21 RX ADMIN — PREGABALIN 100 MILLIGRAM(S): 50 CAPSULE ORAL at 14:00

## 2024-06-21 RX ADMIN — Medication 2 PUFF(S): at 08:09

## 2024-06-21 RX ADMIN — Medication 15 MILLIGRAM(S): at 00:05

## 2024-06-21 RX ADMIN — LIDOCAINE HCL 2 PATCH: 28 GEL TOPICAL at 09:06

## 2024-06-21 RX ADMIN — ENOXAPARIN SODIUM 40 MILLIGRAM(S): 100 INJECTION SUBCUTANEOUS at 17:22

## 2024-07-03 PROBLEM — J45.909 UNSPECIFIED ASTHMA, UNCOMPLICATED: Chronic | Status: ACTIVE | Noted: 2024-05-25

## 2024-07-22 ENCOUNTER — APPOINTMENT (OUTPATIENT)
Dept: NEUROSURGERY | Facility: CLINIC | Age: 54
End: 2024-07-22